# Patient Record
Sex: MALE | Race: WHITE | NOT HISPANIC OR LATINO | Employment: OTHER | URBAN - METROPOLITAN AREA
[De-identification: names, ages, dates, MRNs, and addresses within clinical notes are randomized per-mention and may not be internally consistent; named-entity substitution may affect disease eponyms.]

---

## 2017-02-03 LAB
ANION GAP SERPL CALCULATED.3IONS-SCNC: ABNORMAL MMOL/L
BUN SERPL-MCNC: 19 MG/DL
CALCIUM SERPL-MCNC: 9.1 MG/DL
CHLORIDE SERPLBLD-SCNC: 105 MMOL/L
CHOLEST SERPL-MCNC: 130 MG/DL
CHOLEST SERPL-MCNC: 130 MG/DL
CO2 SERPL-SCNC: 27 MMOL/L
CREAT SERPL-MCNC: 1 MG/DL
ERYTHROCYTE [DISTWIDTH] IN BLOOD BY AUTOMATED COUNT: 12.7 %
GFR SERPL CREATININE-BSD FRML MDRD: >60 ML/MIN/1.73M2
GLUCOSE SERPL-MCNC: 112 MG/DL (ref 70–100)
HCT VFR BLD AUTO: 45.4 %
HDLC SERPL-MCNC: 46 MG/DL
HDLC SERPL-MCNC: 46 MG/DL
HEMOGLOBIN: 15.5 G/DL (ref 13.4–17.5)
LDLC SERPL CALC-MCNC: 69 MG/DL
LDLC SERPL CALC-MCNC: 69 MG/DL
MCH RBC QN AUTO: NORMAL PG
MCHC RBC AUTO-ENTMCNC: NORMAL G/DL
MCV RBC AUTO: 86.5 FL
NONHDLC SERPL-MCNC: NORMAL MG/DL
NONHDLC SERPL-MCNC: NORMAL MG/DL
PLATELET # BLD AUTO: 188 10^9/L
POTASSIUM SERPL-SCNC: 4.4 MMOL/L
RBC # BLD AUTO: 5.25 10^12/L
SODIUM SERPL-SCNC: 140 MMOL/L
TRIGL SERPL-MCNC: 77 MG/DL
TRIGL SERPL-MCNC: 77 MG/DL
TSH SERPL-ACNC: 3.46 MCU/ML
WBC # BLD AUTO: 6.2 10^9/L

## 2017-03-01 ENCOUNTER — TRANSFERRED RECORDS (OUTPATIENT)
Dept: HEALTH INFORMATION MANAGEMENT | Facility: CLINIC | Age: 62
End: 2017-03-01

## 2017-03-02 DIAGNOSIS — Z98.61 CAD S/P PERCUTANEOUS CORONARY ANGIOPLASTY: ICD-10-CM

## 2017-03-02 DIAGNOSIS — I25.10 CAD S/P PERCUTANEOUS CORONARY ANGIOPLASTY: ICD-10-CM

## 2017-03-02 RX ORDER — ATORVASTATIN CALCIUM 80 MG/1
80 TABLET, FILM COATED ORAL DAILY
Qty: 90 TABLET | Refills: 3 | Status: SHIPPED | OUTPATIENT
Start: 2017-03-02 | End: 2018-05-25

## 2017-03-06 DIAGNOSIS — R07.89 CHEST PAIN, MIDSTERNAL: ICD-10-CM

## 2017-03-06 RX ORDER — METOPROLOL SUCCINATE 25 MG/1
12.5 TABLET, EXTENDED RELEASE ORAL DAILY
Qty: 45 TABLET | Refills: 3 | Status: SHIPPED | OUTPATIENT
Start: 2017-03-06 | End: 2018-04-20

## 2017-03-07 ENCOUNTER — TRANSFERRED RECORDS (OUTPATIENT)
Dept: HEALTH INFORMATION MANAGEMENT | Facility: CLINIC | Age: 62
End: 2017-03-07

## 2017-12-12 ENCOUNTER — SURGERY (OUTPATIENT)
Age: 62
End: 2017-12-12

## 2017-12-12 ENCOUNTER — HOSPITAL ENCOUNTER (OUTPATIENT)
Facility: CLINIC | Age: 62
Discharge: HOME | End: 2017-12-12
Attending: COLON & RECTAL SURGERY | Admitting: COLON & RECTAL SURGERY
Payer: COMMERCIAL

## 2017-12-12 VITALS
OXYGEN SATURATION: 96 % | SYSTOLIC BLOOD PRESSURE: 132 MMHG | DIASTOLIC BLOOD PRESSURE: 94 MMHG | RESPIRATION RATE: 10 BRPM

## 2017-12-12 LAB — COLONOSCOPY: NORMAL

## 2017-12-12 PROCEDURE — 45378 DIAGNOSTIC COLONOSCOPY: CPT | Performed by: COLON & RECTAL SURGERY

## 2017-12-12 PROCEDURE — G0500 MOD SEDAT ENDO SERVICE >5YRS: HCPCS | Performed by: COLON & RECTAL SURGERY

## 2017-12-12 PROCEDURE — 25000128 H RX IP 250 OP 636: Performed by: COLON & RECTAL SURGERY

## 2017-12-12 PROCEDURE — G0121 COLON CA SCRN NOT HI RSK IND: HCPCS | Performed by: COLON & RECTAL SURGERY

## 2017-12-12 RX ORDER — NALOXONE HYDROCHLORIDE 0.4 MG/ML
.1-.4 INJECTION, SOLUTION INTRAMUSCULAR; INTRAVENOUS; SUBCUTANEOUS
Status: DISCONTINUED | OUTPATIENT
Start: 2017-12-12 | End: 2017-12-12 | Stop reason: HOSPADM

## 2017-12-12 RX ORDER — ONDANSETRON 2 MG/ML
4 INJECTION INTRAMUSCULAR; INTRAVENOUS
Status: DISCONTINUED | OUTPATIENT
Start: 2017-12-12 | End: 2017-12-12 | Stop reason: HOSPADM

## 2017-12-12 RX ORDER — FENTANYL CITRATE 50 UG/ML
INJECTION, SOLUTION INTRAMUSCULAR; INTRAVENOUS PRN
Status: DISCONTINUED | OUTPATIENT
Start: 2017-12-12 | End: 2017-12-12 | Stop reason: HOSPADM

## 2017-12-12 RX ORDER — FLUMAZENIL 0.1 MG/ML
0.2 INJECTION, SOLUTION INTRAVENOUS
Status: DISCONTINUED | OUTPATIENT
Start: 2017-12-12 | End: 2017-12-12 | Stop reason: HOSPADM

## 2017-12-12 RX ORDER — ONDANSETRON 4 MG/1
4 TABLET, ORALLY DISINTEGRATING ORAL EVERY 6 HOURS PRN
Status: DISCONTINUED | OUTPATIENT
Start: 2017-12-12 | End: 2017-12-12 | Stop reason: HOSPADM

## 2017-12-12 RX ORDER — LIDOCAINE 40 MG/G
CREAM TOPICAL
Status: DISCONTINUED | OUTPATIENT
Start: 2017-12-12 | End: 2017-12-12 | Stop reason: HOSPADM

## 2017-12-12 RX ORDER — ONDANSETRON 2 MG/ML
4 INJECTION INTRAMUSCULAR; INTRAVENOUS EVERY 6 HOURS PRN
Status: DISCONTINUED | OUTPATIENT
Start: 2017-12-12 | End: 2017-12-12 | Stop reason: HOSPADM

## 2017-12-12 RX ADMIN — MIDAZOLAM 2 MG: 1 INJECTION INTRAMUSCULAR; INTRAVENOUS at 16:19

## 2017-12-12 RX ADMIN — FENTANYL CITRATE 100 MCG: 50 INJECTION, SOLUTION INTRAMUSCULAR; INTRAVENOUS at 16:17

## 2017-12-12 NOTE — BRIEF OP NOTE
[facility] Brief Operative Note    Pre-operative diagnosis: 10 YEAR RECALL   Post-operative diagnosis normal colonoscopy   Procedure: Procedure(s):  COLONOSCOPY - Wound Class: II-Clean Contaminated   Surgeon(s): Surgeon(s) and Role:     * Michael Kramer MD - Primary   Estimated blood loss: * No values recorded between 12/12/2017 12:00 AM and 12/12/2017  4:39 PM *    Specimens: * No specimens in log *   Findings: normal colonoscopy  See provation procedure note in chart review.    Michael Kramer MD  [facility]  [phone]

## 2017-12-12 NOTE — H&P
[facility]    History and Physical  Colon and Rectal Surgery     Date of Admission:  12/12/2017      Assessment & Plan   Keith Corea is a 62 year old male who presents for colonoscopy.    Indication: screening  Plan for Colonoscopy with possible biopsy, possible polypectomy. We discussed the risks, benefits and alternatives and the patient wished to proceed.    The above has been forwarded to the consulting provider.      Michael Kramer MD  [facility], [facility]  [phone]        Code Status   Full Code    Primary Care Physician   Amy Guzman      History is obtained from the patient    History of Present Illness   Keith Corea is a 62 year old male who presents for screening    Past Medical History    I have reviewed this patient's medical history and updated it with pertinent information if needed.   Past Medical History:   Diagnosis Date     Allergic rhinitis, cause unspecified      CAD (coronary artery disease) 10/2012    95% narrowing ramus intermedius     Contact dermatitis and other eczema, due to unspecified cause      Hyperlipidemia LDL goal <70     mixed     Impaired fasting glucose      Mixed hyperlipidemia      Sleep apnea        Past Surgical History   I have reviewed this patient's surgical history and updated it with pertinent information if needed.  Past Surgical History:   Procedure Laterality Date     CL AFF SURGICAL PATHOLOGY      lipoma excision, left trunk     ESOPHAGOSCOPY, GASTROSCOPY, DUODENOSCOPY (EGD), COMBINED  10/21/2011    Procedure:COMBINED ESOPHAGOSCOPY, GASTROSCOPY, DUODENOSCOPY (EGD), BIOPSY SINGLE OR MULTIPLE; Surgeon:MATTHEW SALMERON; Location: GI     HC COLONOSCOPY THRU STOMA, DIAGNOSTIC  2007    NL, repeat in 10 years     HEART CATH, ANGIOPLASTY  10-01-12    PTCA and implantation of 3.0 x 18 mm length Medtronic Resolute zotarolimus-eluting stent in the mid segment of the ramus intermedius branch      VASECTOMY  1993        Prior to Admission Medications   Prior to Admission Medications   Prescriptions Last Dose Informant Patient Reported? Taking?   Fexofenadine HCl (ALLEGRA ALLERGY PO)   Yes No   Sig: Take by mouth as needed    aspirin 81 MG EC tablet   No No   Sig: Take 4 tablets (325 mg) by mouth daily Start tomorrow morning.   atorvastatin (LIPITOR) 80 MG tablet   No No   Sig: Take 1 tablet (80 mg) by mouth daily   metoprolol (TOPROL-XL) 25 MG 24 hr tablet   No No   Sig: Take 0.5 tablets (12.5 mg) by mouth daily   nitroglycerin (NITROSTAT) 0.4 MG SL tablet   No No   Sig: Place 1 tablet (0.4 mg) under the tongue every 5 minutes as needed for chest pain If you are still having symptoms after 3 doses (15 minutes) call 911.      Facility-Administered Medications: None     Allergies   Allergies   Allergen Reactions     Seasonal Allergies        Social History   I have reviewed this patient's social history and updated it with pertinent information if needed. Keith Corea  reports that he has never smoked. He has never used smokeless tobacco. He reports that he drinks alcohol. He reports that he does not use illicit drugs.    Family History   I have reviewed this patient's family history and updated it with pertinent information if needed.   Family History   Problem Relation Age of Onset     Lipids Mother      CANCER Mother      lung, smoker     Lipids Father      Neurologic Disorder Brother      syncope     HEART DISEASE Maternal Grandfather       of MI     Connective Tissue Disorder Daughter      Sjogren's syndrome       Review of Systems   C: NEGATIVE for fever, chills, change in weight  E/M: NEGATIVE for ear, mouth and throat problems  R: NEGATIVE for significant cough or SOB  CV: NEGATIVE for chest pain, palpitations or peripheral edema    Physical Exam                      Vital Signs with Ranges     0 lbs 0 oz    Constitutional: awake, alert, cooperative, no apparent distress, and appears stated age  AIRWAY EXAM:  Mallampatti Class I (visualization of the soft palate, fauces, uvula, anterior and posterior pillars)  Respiratory: No increased work of breathing, good air exchange, clear to auscultation bilaterally, no crackles or wheezing  Cardiovascular: Normal apical impulse, regular rate and rhythm, normal S1 and S2, no S3 or S4, and no murmur noted  ASA Class: 2 - Mild systemic disease

## 2017-12-19 PROBLEM — I25.10 CORONARY ARTERY DISEASE INVOLVING NATIVE CORONARY ARTERY OF NATIVE HEART WITHOUT ANGINA PECTORIS: Status: ACTIVE | Noted: 2017-12-19

## 2017-12-27 ENCOUNTER — PRE VISIT (OUTPATIENT)
Dept: CARDIOLOGY | Facility: CLINIC | Age: 62
End: 2017-12-27

## 2018-01-19 ENCOUNTER — OFFICE VISIT (OUTPATIENT)
Dept: CARDIOLOGY | Facility: CLINIC | Age: 63
End: 2018-01-19
Attending: INTERNAL MEDICINE
Payer: COMMERCIAL

## 2018-01-19 VITALS
DIASTOLIC BLOOD PRESSURE: 76 MMHG | BODY MASS INDEX: 26.96 KG/M2 | HEART RATE: 47 BPM | SYSTOLIC BLOOD PRESSURE: 123 MMHG | HEIGHT: 69 IN | WEIGHT: 182 LBS

## 2018-01-19 DIAGNOSIS — I25.10 CAD S/P PERCUTANEOUS CORONARY ANGIOPLASTY: ICD-10-CM

## 2018-01-19 DIAGNOSIS — Z98.61 CAD S/P PERCUTANEOUS CORONARY ANGIOPLASTY: ICD-10-CM

## 2018-01-19 LAB
CHOLEST SERPL-MCNC: 136 MG/DL
HDLC SERPL-MCNC: 50 MG/DL
LDLC SERPL CALC-MCNC: 70 MG/DL
NONHDLC SERPL-MCNC: 86 MG/DL
TRIGL SERPL-MCNC: 82 MG/DL

## 2018-01-19 PROCEDURE — 36415 COLL VENOUS BLD VENIPUNCTURE: CPT | Performed by: INTERNAL MEDICINE

## 2018-01-19 PROCEDURE — 99213 OFFICE O/P EST LOW 20 MIN: CPT | Performed by: INTERNAL MEDICINE

## 2018-01-19 PROCEDURE — 80061 LIPID PANEL: CPT | Performed by: INTERNAL MEDICINE

## 2018-01-19 NOTE — LETTER
1/19/2018      Amy Megan, APRN CNP  [address]  [place] [address]      RE: Keith Spraguear       Dear Colleague,    I had the pleasure of seeing Keith Corea in the [facility].    HISTORY OF PRESENT ILLNESS:  I had the pleasure of seeing Mr. Corea in followup at the [facility] today.  He is a very pleasant 62-year-old gentleman whom I last saw in 01/2016.  He has a history of coronary artery disease and is status post stenting of a 95% lesion in the ramus intermedius in 09/2012 for exertional angina.  He also had a 70%-75% lesion in the LAD which we have been following by serial stress testing.  His most recent stress echocardiogram in 2015 demonstrated no evidence of ischemia and he exhibited excellent exercise tolerance, completing 14 minutes on the Qamar protocol.      He presents today feeling well overall from a cardiovascular standpoint.  He specifically denies any chest pain, dyspnea on exertion, PND, orthopnea or lower extremity edema.  He denies any syncope or presyncope.  He continues to ski regularly and covered 24 miles over the weekend.  He denies any syncope or presyncope.  He has been compliant with all of his medications.      PHYSICAL EXAMINATION:  dictated below.      A lipid panel has been ordered for today and is still pending.      IMPRESSION:   1.  Coronary artery disease, status post drug-eluting stent placement to the ramus intermedius in 10/2012.   2.  Moderate LAD disease that has not been found to be flow-limiting based on serial stress imaging studies.   3.  Dyslipidemia.      Mr. Corea is doing well overall from a cardiovascular standpoint.  There is no evidence of angina or congestive heart failure.  His medications appear appropriate, although we will check a repeat lipid panel today for reassessment in this regard.      Given his moderate LAD disease and his plans for racing the [employer] in February,  I think it would be reasonable to repeat a stress perfusion study.  If this is within normal limits, I will have him follow up with me in approximately 1 year.        Outpatient Encounter Prescriptions as of 1/19/2018   Medication Sig Dispense Refill     metoprolol (TOPROL-XL) 25 MG 24 hr tablet Take 0.5 tablets (12.5 mg) by mouth daily 45 tablet 3     atorvastatin (LIPITOR) 80 MG tablet Take 1 tablet (80 mg) by mouth daily 90 tablet 3     aspirin 81 MG EC tablet Take 4 tablets (325 mg) by mouth daily Start tomorrow morning. 90 tablet 3     nitroglycerin (NITROSTAT) 0.4 MG SL tablet Place 1 tablet (0.4 mg) under the tongue every 5 minutes as needed for chest pain If you are still having symptoms after 3 doses (15 minutes) call 911. 25 tablet 2     Fexofenadine HCl (ALLEGRA ALLERGY PO) Take by mouth as needed        No facility-administered encounter medications on file as of 1/19/2018.        Again, thank you for allowing me to participate in the care of your patient.      Sincerely,    Amandeep Clark MD     [facility]

## 2018-01-19 NOTE — MR AVS SNAPSHOT
After Visit Summary   1/19/2018    Keith Corea    MRN: [id number]           Patient Information     Date Of Birth          1955        Visit Information        Provider Department      1/19/2018 3:15 PM Amandeep Clark MD [facility]   [place]        Today's Diagnoses     CAD S/P percutaneous coronary angioplasty           Follow-ups after your visit        Additional Services     Follow-Up with Cardiologist                 Your next 10 appointments already scheduled     Jan 23, 2018  8:00 AM CST   NM SH CV MPI MULT RST ST 1 DAY with SCINM1   [facility] CV Nuclear Medicine (Cardiovascular Imaging at [facility])    [address]  [address]  [place] [address] [phone]           For a ONE day exam: Allow 3-4 hours for test. For a TWO day exam: Allow 2 hours PER day for test.  You may need to stop some medicines before the test. Follow your doctor s orders. - If you take a beta blocker: Follow your doctor s specific instructions on taking it prior to and on the day of your exam. - If you take Aggrenox or dipyridamole (Persantine, Permole), stop taking it 48 hours before your test. - If you take Viagra, Cialis or Levitra, stop taking it 48 hours before your test. - If you take theophylline or aminophylline, stop taking it 12 hours before your test.  For patients with diabetes: - If you take insulin, call your diabetes care team. Ask if you should take a 1/2 dose the morning of your test. - If you take diabetes medicine by mouth, don t take it on the morning of your test. Bring it with you to take after the test. (If you have questions, call your diabetes care team.)  Do not take nitrates on the day of your test. Do not wear your Nitro-Patch.  Stop all caffeine 12 hours before the test. This includes coffee, tea, soda pop, chocolate and certain medicines (such as Anacin, Excedrin and NoDoz). Also avoid decaf coffee and tea, as  "these contain small amounts of caffeine.  No alcohol, smoking or other tobacco for 12 hours before the test.  Stop eating 3 hours before the test. You may drink water.  Please wear a loose two-piece outfit. If you will have an exercise test, bring rubber-soled walking shoes.  When you arrive, please tell us if you: - Have diabetes - Are breastfeeding - May be pregnant - Have a pacemaker of ICD (implantable defibrillator).  Please call your Imaging Department at your exam site with any questions.              Who to contact     If you have questions or need follow up information about today's clinic visit or your schedule please contact [facility] directly at [phone].  Normal or non-critical lab and imaging results will be communicated to you by ecoInsighthart, letter or phone within 4 business days after the clinic has received the results. If you do not hear from us within 7 days, please contact the clinic through ecoInsighthart or phone. If you have a critical or abnormal lab result, we will notify you by phone as soon as possible.  Submit refill requests through [facility] or call your pharmacy and they will forward the refill request to us. Please allow 3 business days for your refill to be completed.          Additional Information About Your Visit        ecoInsightharSoloPower Information     [facility] lets you send messages to your doctor, view your test results, renew your prescriptions, schedule appointments and more. To sign up, go to [email or web address] . Click on \"Log in\" on the left side of the screen, which will take you to the Welcome page. Then click on \"Sign up Now\" on the right side of the page.     You will be asked to enter the access code listed below, as well as some personal information. Please follow the directions to create your username and password.     Your access code is: [email or web address]  Expires: 2018  8:14 AM     Your access code will  in 90 days. If you need help " "or a new code, please call your [facility] clinic or [phone].        Care EveryWhere ID     This is your Care EveryWhere ID. This could be used by other organizations to access your [facility] medical records  [id number]        Your Vitals Were     Pulse Height BMI (Body Mass Index)             47 1.753 m (5' 9.02\") 26.86 kg/m2          Blood Pressure from Last 3 Encounters:   01/19/18 123/76   12/12/17 (!) 132/94   01/18/16 94/64    Weight from Last 3 Encounters:   01/19/18 82.6 kg (182 lb)   01/18/16 83 kg (183 lb)   10/28/15 82.1 kg (181 lb)              We Performed the Following     Follow-Up with Cardiologist        Primary Care Provider Office Phone # Fax #    NAYA Chiu [phone]       [address]  [place] [address]        Equal Access to Services     [facility]: Hadii aad ku hadasho Soomaali, waaxda luqadaha, qaybta kaalmada adeegyada, waxay idiin hayaan adeeg kharash la'arlenen . So [facility] [phone].    ATENCIÓN: Si habla español, tiene a rivera disposición servicios gratuitos de asistencia lingüística. Llame al [phone].    We comply with applicable federal civil rights laws and [place] laws. We do not discriminate on the basis of race, color, national origin, age, disability, sex, sexual orientation, or gender identity.            Thank you!     Thank you for choosing [facility]  for your care. Our goal is always to provide you with excellent care. Hearing back from our patients is one way we can continue to improve our services. Please take a few minutes to complete the written survey that you may receive in the mail after your visit with us. Thank you!             Your Updated Medication List - Protect others around you: Learn how to safely use, store and throw away your medicines at www.disposemymeds.org.          This list is accurate as of: 1/19/18 11:59 PM.  Always use your most recent med list.                   " Brand Name Dispense Instructions for use Diagnosis    ALLEGRA ALLERGY PO      Take by mouth as needed        aspirin 81 MG EC tablet     90 tablet    Take 4 tablets (325 mg) by mouth daily Start tomorrow morning.    CAD (coronary artery disease)       atorvastatin 80 MG tablet    LIPITOR    90 tablet    Take 1 tablet (80 mg) by mouth daily    CAD S/P percutaneous coronary angioplasty       metoprolol succinate 25 MG 24 hr tablet    TOPROL-XL    45 tablet    Take 0.5 tablets (12.5 mg) by mouth daily    Chest pain, midsternal       nitroGLYcerin 0.4 MG sublingual tablet    NITROSTAT    25 tablet    Place 1 tablet (0.4 mg) under the tongue every 5 minutes as needed for chest pain If you are still having symptoms after 3 doses (15 minutes) call 911.    CAD (coronary artery disease)

## 2018-01-20 NOTE — PROGRESS NOTES
HISTORY OF PRESENT ILLNESS:  I had the pleasure of seeing Mr. Corea in followup at the [facility] today.  He is a very pleasant 62-year-old gentleman whom I last saw in 01/2016.  He has a history of coronary artery disease and is status post stenting of a 95% lesion in the ramus intermedius in 09/2012 for exertional angina.  He also had a 70%-75% lesion in the LAD which we have been following by serial stress testing.  His most recent stress echocardiogram in 2015 demonstrated no evidence of ischemia and he exhibited excellent exercise tolerance, completing 14 minutes on the Qamar protocol.      He presents today feeling well overall from a cardiovascular standpoint.  He specifically denies any chest pain, dyspnea on exertion, PND, orthopnea or lower extremity edema.  He denies any syncope or presyncope.  He continues to ski regularly and covered 24 miles over the weekend.  He denies any syncope or presyncope.  He has been compliant with all of his medications.      PHYSICAL EXAMINATION:  dictated below.      A lipid panel has been ordered for today and is still pending.      IMPRESSION:   1.  Coronary artery disease, status post drug-eluting stent placement to the ramus intermedius in 10/2012.   2.  Moderate LAD disease that has not been found to be flow-limiting based on serial stress imaging studies.   3.  Dyslipidemia.      Mr. Corea is doing well overall from a cardiovascular standpoint.  There is no evidence of angina or congestive heart failure.  His medications appear appropriate, although we will check a repeat lipid panel today for reassessment in this regard.      Given his moderate LAD disease and his plans for racing the [employer] in February, I think it would be reasonable to repeat a stress perfusion study.  If this is within normal limits, I will have him follow up with me in approximately 1 year.         KEON SINGER MD             D: 01/19/2018 15:49   T:  2018 19:17   MT: POLI      Name:     OMID ZIMMERMAN   MRN:      [id number]        Account:      [id number]   :      1955           Service Date: 2018      Document: [id number]

## 2018-01-22 ENCOUNTER — DOCUMENTATION ONLY (OUTPATIENT)
Dept: CARDIOLOGY | Facility: CLINIC | Age: 63
End: 2018-01-22

## 2018-01-22 NOTE — PROGRESS NOTES
HPI and Plan:   See dictation    Orders Placed This Encounter   Procedures     NM Exercise stress test (nuc card)     Lipid Profile       No orders of the defined types were placed in this encounter.      There are no discontinued medications.      Encounter Diagnosis   Name Primary?     CAD S/P percutaneous coronary angioplasty        CURRENT MEDICATIONS:  Current Outpatient Prescriptions   Medication Sig Dispense Refill     metoprolol (TOPROL-XL) 25 MG 24 hr tablet Take 0.5 tablets (12.5 mg) by mouth daily 45 tablet 3     atorvastatin (LIPITOR) 80 MG tablet Take 1 tablet (80 mg) by mouth daily 90 tablet 3     aspirin 81 MG EC tablet Take 4 tablets (325 mg) by mouth daily Start tomorrow morning. 90 tablet 3     nitroglycerin (NITROSTAT) 0.4 MG SL tablet Place 1 tablet (0.4 mg) under the tongue every 5 minutes as needed for chest pain If you are still having symptoms after 3 doses (15 minutes) call 911. 25 tablet 2     Fexofenadine HCl (ALLEGRA ALLERGY PO) Take by mouth as needed          ALLERGIES     Allergies   Allergen Reactions     Seasonal Allergies        PAST MEDICAL HISTORY:  Past Medical History:   Diagnosis Date     Allergic rhinitis, cause unspecified      CAD (coronary artery disease) 10/2012    95% narrowing ramus intermedius     Contact dermatitis and other eczema, due to unspecified cause      Hyperlipidemia LDL goal <70     mixed     Impaired fasting glucose      Mixed hyperlipidemia      Sleep apnea        PAST SURGICAL HISTORY:  Past Surgical History:   Procedure Laterality Date     CL AFF SURGICAL PATHOLOGY      lipoma excision, left trunk     COLONOSCOPY N/A 12/12/2017    Procedure: COLONOSCOPY;  COLONOSCOPY;  Surgeon: Michael Kramer MD;  Location:  GI     ESOPHAGOSCOPY, GASTROSCOPY, DUODENOSCOPY (EGD), COMBINED  10/21/2011    Procedure:COMBINED ESOPHAGOSCOPY, GASTROSCOPY, DUODENOSCOPY (EGD), BIOPSY SINGLE OR MULTIPLE; Surgeon:MATTHEW SALMERON; Location: GI     HC COLONOSCOPY THRU  "STOMA, DIAGNOSTIC      NL, repeat in 10 years     HEART CATH, ANGIOPLASTY  10-01-12    PTCA and implantation of 3.0 x 18 mm length Medtronic Resolute zotarolimus-eluting stent in the mid segment of the ramus intermedius branch      VASECTOMY         FAMILY HISTORY:  Family History   Problem Relation Age of Onset     Lipids Mother      CANCER Mother      lung, smoker     Lipids Father      Neurologic Disorder Brother      syncope     HEART DISEASE Maternal Grandfather       of MI     Connective Tissue Disorder Daughter      Sjogren's syndrome       SOCIAL HISTORY:  Social History     Social History     Marital status:      Spouse name: N/A     Number of children: N/A     Years of education: N/A     Social History Main Topics     Smoking status: Never Smoker     Smokeless tobacco: Never Used     Alcohol use Yes      Comment: 2 beer per day     Drug use: No     Sexual activity: Yes     Partners: Female     Other Topics Concern     Caffeine Concern No     2 pops a day     Sleep Concern No     Stress Concern No     Weight Concern No     Special Diet No     Exercise Yes     cross country skiing, biking      Bike Helmet Yes     Seat Belt Yes     Social History Narrative       Review of Systems:  Skin:  Negative       Eyes:  Positive for glasses    ENT:  Negative      Respiratory:  Negative       Cardiovascular:  Negative      Gastroenterology: Negative      Genitourinary:  Positive for prostate problem    Musculoskeletal:  Negative      Neurologic:  Positive for numbness or tingling of hands    Psychiatric:  Negative      Heme/Lymph/Imm:  Negative      Endocrine:  Negative        Physical Exam:  Vitals: /76  Pulse (!) 47  Ht 1.753 m (5' 9.02\")  Wt 82.6 kg (182 lb)  BMI 26.86 kg/m2    Constitutional:  cooperative, alert and oriented, well developed, well nourished, in no acute distress        Skin:  warm and dry to the touch, no apparent skin lesions or masses noted          Head:  " normocephalic, no masses or lesions        Eyes:           Lymph:      ENT:  no pallor or cyanosis, dentition good        Neck:           Respiratory:  normal breath sounds, clear to auscultation, normal A-P diameter, normal symmetry, normal respiratory excursion, no use of accessory muscles         Cardiac: regular rhythm, normal S1/S2, no S3 or S4, apical impulse not displaced, no murmurs, gallops or rubs                pulses full and equal, no bruits auscultated                                        GI:  abdomen soft, non-tender, BS normoactive, no mass, no HSM, no bruits        Extremities and Muscular Skeletal:  no deformities, clubbing, cyanosis, erythema observed              Neurological:           Psych:           CC  Amandeep Clark MD  [address]  [place] [id number]

## 2018-01-22 NOTE — PROGRESS NOTES
Lipid panel results noted. Done post OV on 1/19/18.   Chol HDL LDL familia Chol/HDL TG   01/19/18 1606 136 50 70 -- 82   02/03/17 0000 130 46 69 -- 77     Will message Dr. Clark for review.

## 2018-01-23 ENCOUNTER — HOSPITAL ENCOUNTER (OUTPATIENT)
Dept: CARDIOLOGY | Facility: CLINIC | Age: 63
Discharge: HOME | End: 2018-01-23
Attending: INTERNAL MEDICINE | Admitting: INTERNAL MEDICINE
Payer: COMMERCIAL

## 2018-01-23 ENCOUNTER — TELEPHONE (OUTPATIENT)
Dept: CARDIOLOGY | Facility: CLINIC | Age: 63
End: 2018-01-23

## 2018-01-23 DIAGNOSIS — I25.10 CAD S/P PERCUTANEOUS CORONARY ANGIOPLASTY: ICD-10-CM

## 2018-01-23 DIAGNOSIS — I25.10 CORONARY ARTERY DISEASE INVOLVING NATIVE CORONARY ARTERY OF NATIVE HEART WITHOUT ANGINA PECTORIS: Primary | ICD-10-CM

## 2018-01-23 DIAGNOSIS — Z98.61 CAD S/P PERCUTANEOUS CORONARY ANGIOPLASTY: ICD-10-CM

## 2018-01-23 PROCEDURE — 93018 CV STRESS TEST I&R ONLY: CPT | Performed by: INTERNAL MEDICINE

## 2018-01-23 PROCEDURE — 34300033 ZZH RX 343: Performed by: INTERNAL MEDICINE

## 2018-01-23 PROCEDURE — 93017 CV STRESS TEST TRACING ONLY: CPT

## 2018-01-23 PROCEDURE — A9502 TC99M TETROFOSMIN: HCPCS | Performed by: INTERNAL MEDICINE

## 2018-01-23 PROCEDURE — 78452 HT MUSCLE IMAGE SPECT MULT: CPT | Performed by: INTERNAL MEDICINE

## 2018-01-23 PROCEDURE — 93016 CV STRESS TEST SUPVJ ONLY: CPT | Performed by: INTERNAL MEDICINE

## 2018-01-23 RX ADMIN — TETROFOSMIN 8.5 MCI.: 1.38 INJECTION, POWDER, LYOPHILIZED, FOR SOLUTION INTRAVENOUS at 10:24

## 2018-01-23 RX ADMIN — TETROFOSMIN 3.6 MCI.: 1.38 INJECTION, POWDER, LYOPHILIZED, FOR SOLUTION INTRAVENOUS at 08:26

## 2018-01-23 NOTE — TELEPHONE ENCOUNTER
Nuclear stress test 1-23-18 - ordered at Dr. Clark OV 1-19-18 = Given his moderate LAD disease and his plans for racing the [employer] in February, I think it would be reasonable to repeat a stress perfusion study.  If this is within normal limits, I will have him follow up with me in approximately 1 year.   Results:  1.  Myocardial perfusion imaging using single isotope technique  demonstrated a small anterolateral apical defect consistent with  ischemia. There is a small area of anterior/anteroseptal ischemia  towards the base . Heterogeneous tracer uptake may reduce accuracy of  interpretation  2. Gated images demonstrated mild global hypokinesis.  The left  ventricular systolic function is 43% at rest and post stress.  3. Compared to the prior study from 1/21/2014, patchy areas of  decreased uptake in the anterior and anteroseptal septum and  inferoseptal walls were noted that appeared fixed .  4. Hypertensive response to exercise  [place]: Dr. Ashish Meadows/ Amber to review

## 2018-01-24 NOTE — TELEPHONE ENCOUNTER
There is some anterior ischemia on the study and given his known anatomy and plans for a major race I would recommend we schedule an KRYSTIAN visit and cor angio. Thanks.

## 2018-01-24 NOTE — TELEPHONE ENCOUNTER
"Per Dr. Clark's Nuclear stress teste review patient called with recommendation of an KRYSTIAN OF and Coronary angiogram. Patient agrees with plan. Patient states he occasionally feel \" a light twinge of discomfort that can occur anytime\". Patient states he recently skied 24 miles and felt good with no discomfort. Patient transferred to scheduling and KRYSTIAN  And Cath orders entered.   "

## 2018-01-29 ENCOUNTER — TELEPHONE (OUTPATIENT)
Dept: CARDIOLOGY | Facility: CLINIC | Age: 63
End: 2018-01-29

## 2018-01-29 DIAGNOSIS — I25.10 CORONARY ARTERY DISEASE INVOLVING NATIVE CORONARY ARTERY OF NATIVE HEART WITHOUT ANGINA PECTORIS: ICD-10-CM

## 2018-01-29 RX ORDER — LIDOCAINE 40 MG/G
CREAM TOPICAL
Status: CANCELLED | OUTPATIENT
Start: 2018-01-29

## 2018-01-29 RX ORDER — POTASSIUM CHLORIDE 1500 MG/1
20 TABLET, EXTENDED RELEASE ORAL
Status: CANCELLED | OUTPATIENT
Start: 2018-01-29

## 2018-01-29 RX ORDER — SODIUM CHLORIDE 9 MG/ML
INJECTION, SOLUTION INTRAVENOUS CONTINUOUS
Status: CANCELLED | OUTPATIENT
Start: 2018-01-29

## 2018-01-29 RX ORDER — ASPIRIN 81 MG/1
81 TABLET ORAL DAILY
Status: CANCELLED | OUTPATIENT
Start: 2018-01-29

## 2018-01-29 NOTE — TELEPHONE ENCOUNTER
Attempted to contact patient to review pre-angiogram prep for 1/31/18. Left message for patient to call back    Contacted patient to review pre-cath procedures: patient is scheduled for coronary angiogram (left)  on 1/31/18 . Patient's arrival time is 0900 and procedure time is 1100. Patient is aware to be NPO except for medications.  Patient has arranged for transportation and 24 hour f/u care. Patient has no known contract dye allergy. Patient is not diabetic. Patient is not taking anti-coagulation medication. Patient's renal function is WNL for procedure. Patient will continue daily aspirin dose 81 mg. Order for procedure entered.    Will message Dr. Clark to clarify aspirin dose.

## 2018-01-31 ENCOUNTER — OFFICE VISIT (OUTPATIENT)
Dept: CARDIOLOGY | Facility: CLINIC | Age: 63
End: 2018-01-31
Payer: COMMERCIAL

## 2018-01-31 ENCOUNTER — APPOINTMENT (OUTPATIENT)
Dept: CARDIOLOGY | Facility: CLINIC | Age: 63
End: 2018-01-31
Attending: INTERNAL MEDICINE
Payer: COMMERCIAL

## 2018-01-31 ENCOUNTER — HOSPITAL ENCOUNTER (OUTPATIENT)
Facility: CLINIC | Age: 63
Discharge: HOME | End: 2018-01-31
Attending: INTERNAL MEDICINE | Admitting: INTERNAL MEDICINE
Payer: COMMERCIAL

## 2018-01-31 VITALS
SYSTOLIC BLOOD PRESSURE: 104 MMHG | DIASTOLIC BLOOD PRESSURE: 68 MMHG | BODY MASS INDEX: 27.81 KG/M2 | HEART RATE: 52 BPM | HEIGHT: 69 IN | WEIGHT: 187.8 LBS

## 2018-01-31 VITALS
BODY MASS INDEX: 27.81 KG/M2 | HEART RATE: 50 BPM | DIASTOLIC BLOOD PRESSURE: 67 MMHG | TEMPERATURE: 97 F | SYSTOLIC BLOOD PRESSURE: 128 MMHG | HEIGHT: 69 IN | WEIGHT: 187.8 LBS | OXYGEN SATURATION: 98 % | RESPIRATION RATE: 16 BRPM

## 2018-01-31 DIAGNOSIS — I25.10 CORONARY ARTERY DISEASE INVOLVING NATIVE CORONARY ARTERY OF NATIVE HEART WITHOUT ANGINA PECTORIS: ICD-10-CM

## 2018-01-31 DIAGNOSIS — Z98.61 STATUS POST CORONARY ANGIOPLASTY: ICD-10-CM

## 2018-01-31 DIAGNOSIS — I25.10 CORONARY ARTERY DISEASE INVOLVING NATIVE CORONARY ARTERY OF NATIVE HEART, ANGINA PRESENCE UNSPECIFIED: ICD-10-CM

## 2018-01-31 DIAGNOSIS — Z98.61 POSTSURGICAL PERCUTANEOUS TRANSLUMINAL CORONARY ANGIOPLASTY STATUS: ICD-10-CM

## 2018-01-31 DIAGNOSIS — E78.5 HYPERLIPIDEMIA, UNSPECIFIED HYPERLIPIDEMIA TYPE: Primary | ICD-10-CM

## 2018-01-31 PROBLEM — Z98.890 STATUS POST CORONARY ANGIOGRAM: Status: ACTIVE | Noted: 2018-01-31

## 2018-01-31 LAB
ANION GAP SERPL CALCULATED.3IONS-SCNC: 6 MMOL/L (ref 3–14)
APTT PPP: 30 SEC (ref 22–37)
BUN SERPL-MCNC: 16 MG/DL (ref 7–30)
CALCIUM SERPL-MCNC: 8.7 MG/DL (ref 8.5–10.1)
CHLORIDE SERPL-SCNC: 109 MMOL/L (ref 94–109)
CO2 SERPL-SCNC: 28 MMOL/L (ref 20–32)
CREAT SERPL-MCNC: 0.94 MG/DL (ref 0.66–1.25)
ERYTHROCYTE [DISTWIDTH] IN BLOOD BY AUTOMATED COUNT: 13.5 % (ref 10–15)
GFR SERPL CREATININE-BSD FRML MDRD: 82 ML/MIN/1.7M2
GLUCOSE SERPL-MCNC: 93 MG/DL (ref 70–99)
HCT VFR BLD AUTO: 44.4 % (ref 40–53)
HGB BLD-MCNC: 15 G/DL (ref 13.3–17.7)
INR PPP: 1.01 (ref 0.86–1.14)
MCH RBC QN AUTO: 29.4 PG (ref 26.5–33)
MCHC RBC AUTO-ENTMCNC: 33.8 G/DL (ref 31.5–36.5)
MCV RBC AUTO: 87 FL (ref 78–100)
PLATELET # BLD AUTO: 187 10E9/L (ref 150–450)
POTASSIUM SERPL-SCNC: 3.7 MMOL/L (ref 3.4–5.3)
RBC # BLD AUTO: 5.1 10E12/L (ref 4.4–5.9)
SODIUM SERPL-SCNC: 143 MMOL/L (ref 133–144)
WBC # BLD AUTO: 5.6 10E9/L (ref 4–11)

## 2018-01-31 PROCEDURE — C1887 CATHETER, GUIDING: HCPCS

## 2018-01-31 PROCEDURE — 80048 BASIC METABOLIC PNL TOTAL CA: CPT | Performed by: INTERNAL MEDICINE

## 2018-01-31 PROCEDURE — 40000065 ZZH STATISTIC EKG NON-CHARGEABLE

## 2018-01-31 PROCEDURE — 27210892 ZZH CATH CR4

## 2018-01-31 PROCEDURE — 27211089 ZZH KIT ACIST INJECTOR CR3

## 2018-01-31 PROCEDURE — 99214 OFFICE O/P EST MOD 30 MIN: CPT | Performed by: PHYSICIAN ASSISTANT

## 2018-01-31 PROCEDURE — 36415 COLL VENOUS BLD VENIPUNCTURE: CPT

## 2018-01-31 PROCEDURE — 93005 ELECTROCARDIOGRAM TRACING: CPT

## 2018-01-31 PROCEDURE — 27210856 ZZH ACCESS HEART CATH CR2

## 2018-01-31 PROCEDURE — 99153 MOD SED SAME PHYS/QHP EA: CPT

## 2018-01-31 PROCEDURE — 25000128 H RX IP 250 OP 636: Performed by: INTERNAL MEDICINE

## 2018-01-31 PROCEDURE — 93571 IV DOP VEL&/PRESS C FLO 1ST: CPT | Mod: 26 | Performed by: INTERNAL MEDICINE

## 2018-01-31 PROCEDURE — C1725 CATH, TRANSLUMIN NON-LASER: HCPCS

## 2018-01-31 PROCEDURE — 93454 CORONARY ARTERY ANGIO S&I: CPT | Mod: 26 | Performed by: INTERNAL MEDICINE

## 2018-01-31 PROCEDURE — 27210795 ZZH PAD DEFIB QUICK CR4

## 2018-01-31 PROCEDURE — C1769 GUIDE WIRE: HCPCS

## 2018-01-31 PROCEDURE — 99152 MOD SED SAME PHYS/QHP 5/>YRS: CPT | Performed by: INTERNAL MEDICINE

## 2018-01-31 PROCEDURE — 40000235 ZZH STATISTIC TELEMETRY

## 2018-01-31 PROCEDURE — 92928 PRQ TCAT PLMT NTRAC ST 1 LES: CPT | Mod: LD | Performed by: INTERNAL MEDICINE

## 2018-01-31 PROCEDURE — 85610 PROTHROMBIN TIME: CPT | Performed by: INTERNAL MEDICINE

## 2018-01-31 PROCEDURE — 85347 COAGULATION TIME ACTIVATED: CPT

## 2018-01-31 PROCEDURE — 93571 IV DOP VEL&/PRESS C FLO 1ST: CPT

## 2018-01-31 PROCEDURE — 93454 CORONARY ARTERY ANGIO S&I: CPT

## 2018-01-31 PROCEDURE — 85027 COMPLETE CBC AUTOMATED: CPT | Performed by: INTERNAL MEDICINE

## 2018-01-31 PROCEDURE — 25000132 ZZH RX MED GY IP 250 OP 250 PS 637: Performed by: INTERNAL MEDICINE

## 2018-01-31 PROCEDURE — 27210787 ZZH MANIFOLD CR2

## 2018-01-31 PROCEDURE — 27210914 ZZH SHEATH CR8

## 2018-01-31 PROCEDURE — C9600 PERC DRUG-EL COR STENT SING: HCPCS

## 2018-01-31 PROCEDURE — 40000852 ZZH STATISTIC HEART CATH LAB OR EP LAB

## 2018-01-31 PROCEDURE — C1874 STENT, COATED/COV W/DEL SYS: HCPCS

## 2018-01-31 PROCEDURE — 93010 ELECTROCARDIOGRAM REPORT: CPT | Performed by: INTERNAL MEDICINE

## 2018-01-31 PROCEDURE — 27210845 ZZH DEVICE INFLATION CR5

## 2018-01-31 PROCEDURE — 99152 MOD SED SAME PHYS/QHP 5/>YRS: CPT

## 2018-01-31 PROCEDURE — 25000125 ZZHC RX 250: Performed by: INTERNAL MEDICINE

## 2018-01-31 PROCEDURE — 85730 THROMBOPLASTIN TIME PARTIAL: CPT | Performed by: INTERNAL MEDICINE

## 2018-01-31 RX ORDER — ACETAMINOPHEN 325 MG/1
325-650 TABLET ORAL EVERY 4 HOURS PRN
Status: DISCONTINUED | OUTPATIENT
Start: 2018-01-31 | End: 2018-01-31 | Stop reason: HOSPADM

## 2018-01-31 RX ORDER — PROTAMINE SULFATE 10 MG/ML
1-5 INJECTION, SOLUTION INTRAVENOUS
Status: DISCONTINUED | OUTPATIENT
Start: 2018-01-31 | End: 2018-01-31 | Stop reason: HOSPADM

## 2018-01-31 RX ORDER — PROMETHAZINE HYDROCHLORIDE 25 MG/ML
6.25-25 INJECTION, SOLUTION INTRAMUSCULAR; INTRAVENOUS EVERY 4 HOURS PRN
Status: DISCONTINUED | OUTPATIENT
Start: 2018-01-31 | End: 2018-01-31 | Stop reason: HOSPADM

## 2018-01-31 RX ORDER — ASPIRIN 81 MG/1
81-324 TABLET, CHEWABLE ORAL
Status: DISCONTINUED | OUTPATIENT
Start: 2018-01-31 | End: 2018-01-31 | Stop reason: HOSPADM

## 2018-01-31 RX ORDER — FENTANYL CITRATE 50 UG/ML
25-50 INJECTION, SOLUTION INTRAMUSCULAR; INTRAVENOUS
Status: DISCONTINUED | OUTPATIENT
Start: 2018-01-31 | End: 2018-01-31 | Stop reason: HOSPADM

## 2018-01-31 RX ORDER — PROTAMINE SULFATE 10 MG/ML
25-100 INJECTION, SOLUTION INTRAVENOUS EVERY 5 MIN PRN
Status: DISCONTINUED | OUTPATIENT
Start: 2018-01-31 | End: 2018-01-31 | Stop reason: HOSPADM

## 2018-01-31 RX ORDER — DOBUTAMINE HYDROCHLORIDE 200 MG/100ML
2-20 INJECTION INTRAVENOUS CONTINUOUS PRN
Status: DISCONTINUED | OUTPATIENT
Start: 2018-01-31 | End: 2018-01-31 | Stop reason: HOSPADM

## 2018-01-31 RX ORDER — METOPROLOL TARTRATE 1 MG/ML
5 INJECTION, SOLUTION INTRAVENOUS EVERY 5 MIN PRN
Status: DISCONTINUED | OUTPATIENT
Start: 2018-01-31 | End: 2018-01-31 | Stop reason: HOSPADM

## 2018-01-31 RX ORDER — NITROGLYCERIN 0.4 MG/1
0.4 TABLET SUBLINGUAL EVERY 5 MIN PRN
Status: DISCONTINUED | OUTPATIENT
Start: 2018-01-31 | End: 2018-01-31 | Stop reason: HOSPADM

## 2018-01-31 RX ORDER — SODIUM NITROPRUSSIDE 25 MG/ML
100-200 INJECTION INTRAVENOUS
Status: DISCONTINUED | OUTPATIENT
Start: 2018-01-31 | End: 2018-01-31 | Stop reason: HOSPADM

## 2018-01-31 RX ORDER — ASPIRIN 325 MG
325 TABLET ORAL
Status: DISCONTINUED | OUTPATIENT
Start: 2018-01-31 | End: 2018-01-31 | Stop reason: HOSPADM

## 2018-01-31 RX ORDER — NALOXONE HYDROCHLORIDE 0.4 MG/ML
0.4 INJECTION, SOLUTION INTRAMUSCULAR; INTRAVENOUS; SUBCUTANEOUS EVERY 5 MIN PRN
Status: DISCONTINUED | OUTPATIENT
Start: 2018-01-31 | End: 2018-01-31 | Stop reason: HOSPADM

## 2018-01-31 RX ORDER — ASPIRIN 81 MG/1
81 TABLET ORAL DAILY
Status: DISCONTINUED | OUTPATIENT
Start: 2018-01-31 | End: 2018-01-31 | Stop reason: HOSPADM

## 2018-01-31 RX ORDER — FUROSEMIDE 10 MG/ML
20-100 INJECTION INTRAMUSCULAR; INTRAVENOUS
Status: DISCONTINUED | OUTPATIENT
Start: 2018-01-31 | End: 2018-01-31 | Stop reason: HOSPADM

## 2018-01-31 RX ORDER — EPINEPHRINE 1 MG/ML
0.3 INJECTION, SOLUTION, CONCENTRATE INTRAVENOUS
Status: DISCONTINUED | OUTPATIENT
Start: 2018-01-31 | End: 2018-01-31 | Stop reason: HOSPADM

## 2018-01-31 RX ORDER — NITROGLYCERIN 20 MG/100ML
.07-2 INJECTION INTRAVENOUS CONTINUOUS PRN
Status: DISCONTINUED | OUTPATIENT
Start: 2018-01-31 | End: 2018-01-31 | Stop reason: HOSPADM

## 2018-01-31 RX ORDER — BUPIVACAINE HYDROCHLORIDE 2.5 MG/ML
1-10 INJECTION, SOLUTION EPIDURAL; INFILTRATION; INTRACAUDAL
Status: DISCONTINUED | OUTPATIENT
Start: 2018-01-31 | End: 2018-01-31 | Stop reason: HOSPADM

## 2018-01-31 RX ORDER — NICARDIPINE HYDROCHLORIDE 2.5 MG/ML
100 INJECTION INTRAVENOUS
Status: DISCONTINUED | OUTPATIENT
Start: 2018-01-31 | End: 2018-01-31 | Stop reason: HOSPADM

## 2018-01-31 RX ORDER — IOPAMIDOL 755 MG/ML
160 INJECTION, SOLUTION INTRAVASCULAR ONCE
Status: COMPLETED | OUTPATIENT
Start: 2018-01-31 | End: 2018-01-31

## 2018-01-31 RX ORDER — LIDOCAINE 40 MG/G
CREAM TOPICAL
Status: DISCONTINUED | OUTPATIENT
Start: 2018-01-31 | End: 2018-01-31 | Stop reason: HOSPADM

## 2018-01-31 RX ORDER — LORAZEPAM 2 MG/ML
.5-2 INJECTION INTRAMUSCULAR EVERY 4 HOURS PRN
Status: DISCONTINUED | OUTPATIENT
Start: 2018-01-31 | End: 2018-01-31 | Stop reason: HOSPADM

## 2018-01-31 RX ORDER — FLUMAZENIL 0.1 MG/ML
0.2 INJECTION, SOLUTION INTRAVENOUS
Status: DISCONTINUED | OUTPATIENT
Start: 2018-01-31 | End: 2018-01-31 | Stop reason: HOSPADM

## 2018-01-31 RX ORDER — SODIUM CHLORIDE 9 MG/ML
INJECTION, SOLUTION INTRAVENOUS CONTINUOUS
Status: DISCONTINUED | OUTPATIENT
Start: 2018-01-31 | End: 2018-01-31 | Stop reason: HOSPADM

## 2018-01-31 RX ORDER — CLOPIDOGREL 300 MG/1
300-600 TABLET, FILM COATED ORAL
Status: DISCONTINUED | OUTPATIENT
Start: 2018-01-31 | End: 2018-01-31 | Stop reason: HOSPADM

## 2018-01-31 RX ORDER — NITROGLYCERIN 5 MG/ML
100-500 VIAL (ML) INTRAVENOUS
Status: COMPLETED | OUTPATIENT
Start: 2018-01-31 | End: 2018-01-31

## 2018-01-31 RX ORDER — POTASSIUM CHLORIDE 7.45 MG/ML
10 INJECTION INTRAVENOUS
Status: DISCONTINUED | OUTPATIENT
Start: 2018-01-31 | End: 2018-01-31 | Stop reason: HOSPADM

## 2018-01-31 RX ORDER — POTASSIUM CHLORIDE 1500 MG/1
20 TABLET, EXTENDED RELEASE ORAL
Status: COMPLETED | OUTPATIENT
Start: 2018-01-31 | End: 2018-01-31

## 2018-01-31 RX ORDER — NITROGLYCERIN 5 MG/ML
100-200 VIAL (ML) INTRAVENOUS
Status: DISCONTINUED | OUTPATIENT
Start: 2018-01-31 | End: 2018-01-31 | Stop reason: HOSPADM

## 2018-01-31 RX ORDER — DEXTROSE MONOHYDRATE 25 G/50ML
12.5-5 INJECTION, SOLUTION INTRAVENOUS EVERY 30 MIN PRN
Status: DISCONTINUED | OUTPATIENT
Start: 2018-01-31 | End: 2018-01-31 | Stop reason: HOSPADM

## 2018-01-31 RX ORDER — HYDROCODONE BITARTRATE AND ACETAMINOPHEN 5; 325 MG/1; MG/1
1-2 TABLET ORAL EVERY 4 HOURS PRN
Status: DISCONTINUED | OUTPATIENT
Start: 2018-01-31 | End: 2018-01-31 | Stop reason: HOSPADM

## 2018-01-31 RX ORDER — CLOPIDOGREL BISULFATE 75 MG/1
75 TABLET ORAL
Status: DISCONTINUED | OUTPATIENT
Start: 2018-01-31 | End: 2018-01-31 | Stop reason: HOSPADM

## 2018-01-31 RX ORDER — NIFEDIPINE 10 MG/1
10 CAPSULE ORAL
Status: DISCONTINUED | OUTPATIENT
Start: 2018-01-31 | End: 2018-01-31 | Stop reason: HOSPADM

## 2018-01-31 RX ORDER — NALOXONE HYDROCHLORIDE 0.4 MG/ML
.2-.4 INJECTION, SOLUTION INTRAMUSCULAR; INTRAVENOUS; SUBCUTANEOUS
Status: DISCONTINUED | OUTPATIENT
Start: 2018-01-31 | End: 2018-01-31 | Stop reason: HOSPADM

## 2018-01-31 RX ORDER — ONDANSETRON 2 MG/ML
4 INJECTION INTRAMUSCULAR; INTRAVENOUS EVERY 4 HOURS PRN
Status: DISCONTINUED | OUTPATIENT
Start: 2018-01-31 | End: 2018-01-31 | Stop reason: HOSPADM

## 2018-01-31 RX ORDER — METHYLPREDNISOLONE SODIUM SUCCINATE 125 MG/2ML
125 INJECTION, POWDER, LYOPHILIZED, FOR SOLUTION INTRAMUSCULAR; INTRAVENOUS
Status: DISCONTINUED | OUTPATIENT
Start: 2018-01-31 | End: 2018-01-31 | Stop reason: HOSPADM

## 2018-01-31 RX ORDER — VERAPAMIL HYDROCHLORIDE 2.5 MG/ML
1-2.5 INJECTION, SOLUTION INTRAVENOUS
Status: COMPLETED | OUTPATIENT
Start: 2018-01-31 | End: 2018-01-31

## 2018-01-31 RX ORDER — MORPHINE SULFATE 2 MG/ML
1-2 INJECTION, SOLUTION INTRAMUSCULAR; INTRAVENOUS EVERY 5 MIN PRN
Status: DISCONTINUED | OUTPATIENT
Start: 2018-01-31 | End: 2018-01-31 | Stop reason: HOSPADM

## 2018-01-31 RX ORDER — HEPARIN SODIUM 1000 [USP'U]/ML
1000-10000 INJECTION, SOLUTION INTRAVENOUS; SUBCUTANEOUS EVERY 5 MIN PRN
Status: DISCONTINUED | OUTPATIENT
Start: 2018-01-31 | End: 2018-01-31 | Stop reason: HOSPADM

## 2018-01-31 RX ORDER — HYDRALAZINE HYDROCHLORIDE 20 MG/ML
10-20 INJECTION INTRAMUSCULAR; INTRAVENOUS
Status: DISCONTINUED | OUTPATIENT
Start: 2018-01-31 | End: 2018-01-31 | Stop reason: HOSPADM

## 2018-01-31 RX ORDER — ENALAPRILAT 1.25 MG/ML
1.25-2.5 INJECTION INTRAVENOUS
Status: DISCONTINUED | OUTPATIENT
Start: 2018-01-31 | End: 2018-01-31 | Stop reason: HOSPADM

## 2018-01-31 RX ORDER — DOPAMINE HYDROCHLORIDE 160 MG/100ML
2-20 INJECTION, SOLUTION INTRAVENOUS CONTINUOUS PRN
Status: DISCONTINUED | OUTPATIENT
Start: 2018-01-31 | End: 2018-01-31 | Stop reason: HOSPADM

## 2018-01-31 RX ORDER — ADENOSINE 3 MG/ML
12-12000 INJECTION, SOLUTION INTRAVENOUS
Status: DISCONTINUED | OUTPATIENT
Start: 2018-01-31 | End: 2018-01-31 | Stop reason: HOSPADM

## 2018-01-31 RX ORDER — PHENYLEPHRINE HCL IN 0.9% NACL 1 MG/10 ML
20-100 SYRINGE (ML) INTRAVENOUS
Status: DISCONTINUED | OUTPATIENT
Start: 2018-01-31 | End: 2018-01-31 | Stop reason: HOSPADM

## 2018-01-31 RX ORDER — NALOXONE HYDROCHLORIDE 0.4 MG/ML
.1-.4 INJECTION, SOLUTION INTRAMUSCULAR; INTRAVENOUS; SUBCUTANEOUS
Status: DISCONTINUED | OUTPATIENT
Start: 2018-01-31 | End: 2018-01-31 | Stop reason: HOSPADM

## 2018-01-31 RX ORDER — DIPHENHYDRAMINE HYDROCHLORIDE 50 MG/ML
25-50 INJECTION INTRAMUSCULAR; INTRAVENOUS
Status: DISCONTINUED | OUTPATIENT
Start: 2018-01-31 | End: 2018-01-31 | Stop reason: HOSPADM

## 2018-01-31 RX ORDER — ATROPINE SULFATE 0.1 MG/ML
.5-1 INJECTION INTRAVENOUS
Status: DISCONTINUED | OUTPATIENT
Start: 2018-01-31 | End: 2018-01-31 | Stop reason: HOSPADM

## 2018-01-31 RX ORDER — LIDOCAINE HYDROCHLORIDE 10 MG/ML
30 INJECTION, SOLUTION EPIDURAL; INFILTRATION; INTRACAUDAL; PERINEURAL
Status: DISCONTINUED | OUTPATIENT
Start: 2018-01-31 | End: 2018-01-31 | Stop reason: HOSPADM

## 2018-01-31 RX ORDER — LIDOCAINE HYDROCHLORIDE 10 MG/ML
1-10 INJECTION, SOLUTION EPIDURAL; INFILTRATION; INTRACAUDAL; PERINEURAL
Status: COMPLETED | OUTPATIENT
Start: 2018-01-31 | End: 2018-01-31

## 2018-01-31 RX ORDER — ATROPINE SULFATE 0.1 MG/ML
0.5 INJECTION INTRAVENOUS EVERY 5 MIN PRN
Status: DISCONTINUED | OUTPATIENT
Start: 2018-01-31 | End: 2018-01-31 | Stop reason: HOSPADM

## 2018-01-31 RX ORDER — POTASSIUM CHLORIDE 29.8 MG/ML
20 INJECTION INTRAVENOUS
Status: DISCONTINUED | OUTPATIENT
Start: 2018-01-31 | End: 2018-01-31 | Stop reason: HOSPADM

## 2018-01-31 RX ORDER — PRASUGREL 10 MG/1
10-60 TABLET, FILM COATED ORAL
Status: DISCONTINUED | OUTPATIENT
Start: 2018-01-31 | End: 2018-01-31 | Stop reason: HOSPADM

## 2018-01-31 RX ADMIN — SODIUM CHLORIDE: 9 INJECTION, SOLUTION INTRAVENOUS at 09:15

## 2018-01-31 RX ADMIN — FENTANYL CITRATE 50 MCG: 50 INJECTION, SOLUTION INTRAMUSCULAR; INTRAVENOUS at 11:05

## 2018-01-31 RX ADMIN — Medication 5000 UNITS: at 11:16

## 2018-01-31 RX ADMIN — POTASSIUM CHLORIDE 20 MEQ: 1500 TABLET, EXTENDED RELEASE ORAL at 10:01

## 2018-01-31 RX ADMIN — TICAGRELOR 180 MG: 90 TABLET ORAL at 11:37

## 2018-01-31 RX ADMIN — NITROGLYCERIN 400 MCG: 5 INJECTION, SOLUTION INTRAVENOUS at 11:47

## 2018-01-31 RX ADMIN — FENTANYL CITRATE 50 MCG: 50 INJECTION, SOLUTION INTRAMUSCULAR; INTRAVENOUS at 11:20

## 2018-01-31 RX ADMIN — ADENOSINE 192 MCG: 3 INJECTION, SOLUTION INTRAVENOUS at 11:29

## 2018-01-31 RX ADMIN — IOPAMIDOL 160 ML: 755 INJECTION, SOLUTION INTRAVASCULAR at 12:00

## 2018-01-31 RX ADMIN — MIDAZOLAM 1 MG: 1 INJECTION INTRAMUSCULAR; INTRAVENOUS at 11:05

## 2018-01-31 RX ADMIN — NITROGLYCERIN 400 MCG: 5 INJECTION, SOLUTION INTRAVENOUS at 11:15

## 2018-01-31 RX ADMIN — LIDOCAINE HYDROCHLORIDE 10 MG: 10 INJECTION, SOLUTION EPIDURAL; INFILTRATION; INTRACAUDAL; PERINEURAL at 11:12

## 2018-01-31 RX ADMIN — Medication 4000 UNITS: at 11:34

## 2018-01-31 RX ADMIN — MIDAZOLAM 1 MG: 1 INJECTION INTRAMUSCULAR; INTRAVENOUS at 11:20

## 2018-01-31 RX ADMIN — VERAPAMIL HYDROCHLORIDE 2.5 MG: 2.5 INJECTION, SOLUTION INTRAVENOUS at 11:15

## 2018-01-31 RX ADMIN — MIDAZOLAM 1 MG: 1 INJECTION INTRAMUSCULAR; INTRAVENOUS at 11:40

## 2018-01-31 RX ADMIN — FENTANYL CITRATE 50 MCG: 50 INJECTION, SOLUTION INTRAMUSCULAR; INTRAVENOUS at 11:40

## 2018-01-31 NOTE — LETTER
1/31/2018    Amy Guzman, APRN CNP  [address]  [place] [address]    RE: Keith KRISHNAN Anmol       Dear Colleague,    I had the pleasure of seeing Keith Corea in the [facility].      Cardiology Progress Note    Date of Service: 01/31/2018      Reason for visit:  H&P for angiogram    Primary cardiologist: Dr. Amandeep Clark      HPI:  Mr. Corea is a pleasant 62-year-old gentleman followed by Dr. Clark with a PMHx of known CAD, s/p stenting of a 95% lesion in the ramus intermedius in 09/2012 for exertional angina.  He also had a 70%-75% lesion in the LAD which we have been following by serial stress testing.  His most recent stress echocardiogram in 2015 had demonstrated no evidence of ischemia. He saw Dr. Clark earlier this month for routine follow up and was feeling well without angina and has remained active. In fact, he is planning to ski in a race next month. Thus, given his moderate LAD, it was felt reasonable to repeat a stress test and lipid panel for evaluation. This stress test showed a small anterolateral apical defect consistent with ischemia. He had mild global hypokinesis with an EF estimated at 43% at rest and stress. There was also a noted hypertensive response to exercise. This was reviewed by Dr. Clark, and give his anatomy and plans for race as above, it was recommended he undergo coronary angiography.     He is here today for a formal H&P prior to this procedure which is actually planned for later this morning. He continues to remain asymptomatic and has no complaints of HAIDER, chest pain, syncope/presyncope, edema, or orthopnea. He cross country skis up to 20+ miles at a time without symptoms. We discussed the results of his stress test, and the risks and benefits of coronary angiogram. He is NPO this morning, and BP is excellent Of note, his repeat FLP showed acceptable control with an LDL of 70. His HDL is 50, and TG are 82. He remains on statin medication  without any significant side effects.      ASSESSMENT/PLAN:      1. Coronary artery disease.       --Hx of drug-eluting stent placement to the ramus intermedius in 10/2012 and known prior moderate LAD disease that has not been found to be flow-limiting in the past. Treadmill stress test done 1/23/18 shows a small anterolateral apical defect consistent with ischemia. There was mild global hypokinesis with an EF estimated at 43%, and a noted hypertensive response to exercise.    --Recommendation is to proceed with coronary angiogram today, which he is agreeable to.     --->Risks and benefits of left heart catheterization and coronary angiogram were discussed with the patient in detail. These include death, MI, stroke, hematoma at puncture site, possible urgent bypass surgery for failed PCI, use of stents, possible peripheral vascular complications, arrhythmia, possible percutaneous coronary intervention, and alternative of medical therapy alone. The risks discussed also include risk of heart attack or bleeding that would require transfusion, and risk of stroke or needing emergency surgery. We discussed that contrast is used during the procedure which can potentially damage the kidney and discussion was had regarding risk of contrast induced allergic reaction, renal dysfunction, and vascular complications. I have discussed the need for DAPT if stenting is required and there are no contraindications for this. The patient has no noted history of bleeding problems or current bleeding, and no scheduled surgeries pending. The patient also has no known contrast dye allergies. Patient understands and wishes to proceed.     --For now, pending results of above, he remains on ASA 81mg daily, and Toprol XL 12.5mg daily. Noted hypertensive response to exercise but has a somewhat low resting BP. Thus, no changes for now.    2. Dyslipidemia.   --LDL 1/2018 70. Continue atorvastatin which is optimized at 80mg daily.     Follow up  plan: Post angiogram with myself 2/21/18.       Orders this Visit:  No orders of the defined types were placed in this encounter.    No orders of the defined types were placed in this encounter.    There are no discontinued medications.        CURRENT MEDICATIONS:  Current Outpatient Prescriptions   Medication Sig Dispense Refill     aspirin 81 MG EC tablet Take 1 tablet (81 mg) by mouth daily 1 tablet 0     metoprolol (TOPROL-XL) 25 MG 24 hr tablet Take 0.5 tablets (12.5 mg) by mouth daily 45 tablet 3     atorvastatin (LIPITOR) 80 MG tablet Take 1 tablet (80 mg) by mouth daily 90 tablet 3     nitroglycerin (NITROSTAT) 0.4 MG SL tablet Place 1 tablet (0.4 mg) under the tongue every 5 minutes as needed for chest pain If you are still having symptoms after 3 doses (15 minutes) call 911. 25 tablet 2     Fexofenadine HCl (ALLEGRA ALLERGY PO) Take by mouth as needed          ALLERGIES     Allergies   Allergen Reactions     Seasonal Allergies        PAST MEDICAL HISTORY:  Past Medical History:   Diagnosis Date     Allergic rhinitis, cause unspecified      CAD (coronary artery disease) 10/2012    95% narrowing ramus intermedius     Contact dermatitis and other eczema, due to unspecified cause      Hyperlipidemia LDL goal <70     mixed     Impaired fasting glucose      Mixed hyperlipidemia      Sleep apnea        PAST SURGICAL HISTORY:  Past Surgical History:   Procedure Laterality Date     CL AFF SURGICAL PATHOLOGY      lipoma excision, left trunk     COLONOSCOPY N/A 12/12/2017    Procedure: COLONOSCOPY;  COLONOSCOPY;  Surgeon: Michael Kramer MD;  Location:  GI     ESOPHAGOSCOPY, GASTROSCOPY, DUODENOSCOPY (EGD), COMBINED  10/21/2011    Procedure:COMBINED ESOPHAGOSCOPY, GASTROSCOPY, DUODENOSCOPY (EGD), BIOPSY SINGLE OR MULTIPLE; Surgeon:MATTHEW SALMERON; Location: GI     HC COLONOSCOPY THRU STOMA, DIAGNOSTIC  2007    NL, repeat in 10 years     HEART CATH, ANGIOPLASTY  10-01-12    PTCA and implantation of 3.0 x  "18 mm length Medtronic Resolute zotarolimus-eluting stent in the mid segment of the ramus intermedius branch      VASECTOMY  1993       FAMILY HISTORY:  Family History   Problem Relation Age of Onset     Lipids Mother      CANCER Mother      lung, smoker     Lipids Father      Neurologic Disorder Brother      syncope     HEART DISEASE Maternal Grandfather       of MI     Connective Tissue Disorder Daughter      Sjogren's syndrome       SOCIAL HISTORY:  Social History     Social History     Marital status:      Spouse name: N/A     Number of children: N/A     Years of education: N/A     Social History Main Topics     Smoking status: Never Smoker     Smokeless tobacco: Never Used     Alcohol use Yes      Comment: 2 beer per day     Drug use: No     Sexual activity: Yes     Partners: Female     Other Topics Concern     Caffeine Concern No     2 pops a day     Sleep Concern No     Stress Concern No     Weight Concern No     Special Diet No     Exercise Yes     cross country skiing, biking      Bike Helmet Yes     Seat Belt Yes     Social History Narrative       Review of Systems:  Cardiovascular: negative for chest pain, palpitations, orthopnea, LE edema  Constitutional: negative for chills, sweats, fevers   Resp: Negative for dyspnea at rest, dyspnea on exertion, cough, wheezing, known chronic lung disease  HEENT: Negative for new visual changes, frequent headaches  Gastrointestinal: negative for abdominal pain, diarrhea, nausea, vomiting  Hematologic/lymphatic: negative for current systemic anticoagulation, hx of blood clots  Musculoskeletal: negative for new back pain, joint pain  Neurological: negative for focal weakness, LOC, seizures, syncope      Physical Exam:  Vitals: /68  Pulse 52  Ht 1.753 m (5' 9\")  Wt 85.2 kg (187 lb 12.8 oz)  BMI 27.73 kg/m2   Wt Readings from Last 4 Encounters:   18 85.2 kg (187 lb 12.8 oz)   18 82.6 kg (182 lb)   16 83 kg (183 lb)   10/28/15 82.1 kg " (181 lb)       GEN:  In general, this is a well nourished  male in no acute distress on room air.  Patient ambulatory.  HEENT:  Pupils equal, round. Sclerae nonicteric. Clear oropharynx. Mucous membranes moist.  NECK: Supple, no masses appreciated. Trachea midline. No JVD.  C/V:  Regular rate and rhythm, no murmur, rub or gallop.   RESP: Respirations are unlabored. No use of accessory muscles. Clear to auscultation bilaterally without wheezing, rales, or rhonchi.  GI: Abdomen soft, nontender, nondistended.   EXTREM: No LE edema. No cyanosis or clubbing.  NEURO: Alert and oriented, cooperative. Gait normal. No obvious focal deficits.   PSYCH: Normal affect.  SKIN: Warm and dry. No rashes or petechiae appreciated.       Recent Lab Results:  LIPID RESULTS:  Lab Results   Component Value Date    CHOL 136 01/19/2018    HDL 50 01/19/2018    LDL 70 01/19/2018    TRIG 82 01/19/2018       New/Pertinent imaging results since last visit:    1/23/18  Impression  1.  Myocardial perfusion imaging using single isotope technique  demonstrated a small anterolateral apical defect consistent with  ischemia. There is a small area of anterior/anteroseptal ischemia  towards the base . Heterogeneous tracer uptake may reduce accuracy of  interpretation  2. Gated images demonstrated mild global hypokinesis.  The left  ventricular systolic function is 43% at rest and post stress.  3. Compared to the prior study from 1/21/2014, patchy areas of  decreased uptake in the anterior and anteroseptal septum and  inferoseptal walls were noted that appeared fixed .  4. Hypertensive response to exercise      Joy Calhoun PA-C  [facility]  Pager [phone]    Thank you for allowing me to participate in the care of your patient.    Sincerely,     TON Verma     [facility]

## 2018-01-31 NOTE — IP AVS SNAPSHOT
[place] [address]    [place] [address]    Phone:  [phone]                                       After Visit Summary   1/31/2018    Keith Corea    MRN: [id number]           After Visit Summary Signature Page     I have received my discharge instructions, and my questions have been answered. I have discussed any challenges I see with this plan with the nurse or doctor.    ..........................................................................................................................................  Patient/Patient Representative Signature      ..........................................................................................................................................  Patient Representative Print Name and Relationship to Patient    ..................................................               ................................................  Date                                            Time    ..........................................................................................................................................  Reviewed by Signature/Title    ...................................................              ..............................................  Date                                                            Time

## 2018-01-31 NOTE — PROGRESS NOTES
Patient returns from the cath lab, alert and oriented, skin is warm and dry, color is good.  TR on rt radial site, 12 cc in TR band, no bleed, good CMS.  Monitor shows sinus rhythm.  Wife at bedside and Dr. Garcia speaking with both pt and the wife.  Given ice water and a menu.

## 2018-01-31 NOTE — PROGRESS NOTES
Patient arrives ambulatory, alert and oriented for his heart cath.  Skin is warm and dry, color is good and wife is at bedside.

## 2018-01-31 NOTE — MR AVS SNAPSHOT
After Visit Summary   1/31/2018    Keith Corea    MRN: [id number]           Patient Information     Date Of Birth          1955        Visit Information        Provider Department      1/31/2018 8:00 AM Joy Calhoun PA [facility]        Today's Diagnoses     Coronary artery disease involving native coronary artery of native heart without angina pectoris          Care Instructions    Visit Summary:    Today we discussed:   Risks and benefits of your angiogram today.  No medication changes for now.     Follow up:   With Joy on 2/21/18 as scheduled.     Please call my nurse Rosemarie at [phone] with any questions or concerns. Scheduling phone number: [phone] if needed.                 Follow-ups after your visit        Your next 10 appointments already scheduled     Jan 31, 2018 11:00 AM CST   Cath 90 Minute with SHCVR2   [facility] ([facility])    [address] [address] [phone]            Feb 21, 2018  8:00 AM CST   Return Visit with TON Verma   [facility] ([facility])    [address]  [facility] [address] [phone]              Who to contact     If you have questions or need follow up information about today's clinic visit or your schedule please contact [facility] directly at [phone].  Normal or non-critical lab and imaging results will be communicated to you by MyChart, letter or phone within 4 business days after the clinic has received the results. If you do not hear from us within 7 days, please contact the clinic through MyChart or phone. If you have a critical or abnormal lab result, we will notify you by phone as soon as possible.  Submit refill requests through [employer] or call your pharmacy and they will forward the refill request to us.  "Please allow 3 business days for your refill to be completed.          Additional Information About Your Visit        MyChart Information     [employer] lets you send messages to your doctor, view your test results, renew your prescriptions, schedule appointments and more. To sign up, go to [email or web address] . Click on \"Log in\" on the left side of the screen, which will take you to the Welcome page. Then click on \"Sign up Now\" on the right side of the page.     You will be asked to enter the access code listed below, as well as some personal information. Please follow the directions to create your username and password.     Your access code is: [email or web address]  Expires: 2018  8:14 AM     Your access code will  in 90 days. If you need help or a new code, please call your [place] clinic or [phone].        Care EveryWhere ID     This is your Care EveryWhere ID. This could be used by other organizations to access your [place] medical records  [id number]        Your Vitals Were     Pulse Height BMI (Body Mass Index)             52 1.753 m (5' 9\") 27.73 kg/m2          Blood Pressure from Last 3 Encounters:   18 104/68   18 123/76   17 (!) 132/94    Weight from Last 3 Encounters:   18 85.2 kg (187 lb 12.8 oz)   18 82.6 kg (182 lb)   16 83 kg (183 lb)              We Performed the Following     Follow-Up with Cardiac Advanced Practice Provider        Primary Care Provider Office Phone # Fax #    Amy Guzman, [place]AY[place] [phone]       [address]  [place] [address]        Equal Access to Services     IGNA[place]IA [place] : Umu Reina, sera easley, coco bakeralmaarlene wilson, hay rubio. So [facility] [phone].    ATENCIÓN: Si habla español, tiene a rivera disposición servicios gratuitos de asistencia lingüística. Willem al [phone].    We comply with applicable federal civil rights laws and [place] " laws. We do not discriminate on the basis of race, color, national origin, age, disability, sex, sexual orientation, or gender identity.            Thank you!     Thank you for choosing [facility]  for your care. Our goal is always to provide you with excellent care. Hearing back from our patients is one way we can continue to improve our services. Please take a few minutes to complete the written survey that you may receive in the mail after your visit with us. Thank you!             Your Updated Medication List - Protect others around you: Learn how to safely use, store and throw away your medicines at www.disposemymeds.org.          This list is accurate as of 1/31/18  8:18 AM.  Always use your most recent med list.                   Brand Name Dispense Instructions for use Diagnosis    ALLEGRA ALLERGY PO      Take by mouth as needed        aspirin 81 MG EC tablet     1 tablet    Take 1 tablet (81 mg) by mouth daily    Coronary artery disease involving native coronary artery of native heart without angina pectoris       atorvastatin 80 MG tablet    LIPITOR    90 tablet    Take 1 tablet (80 mg) by mouth daily    CAD S/P percutaneous coronary angioplasty       metoprolol succinate 25 MG 24 hr tablet    TOPROL-XL    45 tablet    Take 0.5 tablets (12.5 mg) by mouth daily    Chest pain, midsternal       nitroGLYcerin 0.4 MG sublingual tablet    NITROSTAT    25 tablet    Place 1 tablet (0.4 mg) under the tongue every 5 minutes as needed for chest pain If you are still having symptoms after 3 doses (15 minutes) call 911.    CAD (coronary artery disease)

## 2018-01-31 NOTE — CONSULTS
I have examined the patient, reviewed the history, medications and pre procedural tests. Excellent exercise tolerance with technically challenging study suggesting possible anterior wall ischemia in active 62 year old man with known disease in LAD. Old history PCI RI. I have explained to the patient the risks of death, MI, stroke, hematoma, possible urgent bypass surgery for failed PCI, use of stents, thienopyridine agents, possible peripheral vascular complications, arrhythmia, the use of FFR in clinical decision-making and alternative of medical therapy alone in regards to left heart catheterization, left ventriculography, coronary angiography, and possible percutaneous coronary intervention. The patient voiced understanding and wishes to proceed. The patient has a good right radial pulse, normal ulnar pulse and a normal Alf's sign.

## 2018-01-31 NOTE — PROGRESS NOTES
Cardiology Progress Note    Date of Service: 01/31/2018      Reason for visit:  H&P for angiogram    Primary cardiologist: Dr. Amandeep Clark      HPI:  Mr. Corea is a pleasant 62-year-old gentleman followed by Dr. Clark with a PMHx of known CAD, s/p stenting of a 95% lesion in the ramus intermedius in 09/2012 for exertional angina.  He also had a 70%-75% lesion in the LAD which we have been following by serial stress testing.  His most recent stress echocardiogram in 2015 had demonstrated no evidence of ischemia. He saw Dr. Clark earlier this month for routine follow up and was feeling well without angina and has remained active. In fact, he is planning to ski in a race next month. Thus, given his moderate LAD, it was felt reasonable to repeat a stress test and lipid panel for evaluation. This stress test showed a small anterolateral apical defect consistent with ischemia. He had mild global hypokinesis with an EF estimated at 43% at rest and stress. There was also a noted hypertensive response to exercise. This was reviewed by Dr. Clark, and give his anatomy and plans for race as above, it was recommended he undergo coronary angiography.     He is here today for a formal H&P prior to this procedure which is actually planned for later this morning. He continues to remain asymptomatic and has no complaints of HAIDER, chest pain, syncope/presyncope, edema, or orthopnea. He cross country skis up to 20+ miles at a time without symptoms. We discussed the results of his stress test, and the risks and benefits of coronary angiogram. He is NPO this morning, and BP is excellent Of note, his repeat FLP showed acceptable control with an LDL of 70. His HDL is 50, and TG are 82. He remains on statin medication without any significant side effects.      ASSESSMENT/PLAN:      1. Coronary artery disease.       --Hx of drug-eluting stent placement to the ramus intermedius in 10/2012 and known prior moderate LAD disease that has not been found  to be flow-limiting in the past. Treadmill stress test done 1/23/18 shows a small anterolateral apical defect consistent with ischemia. There was mild global hypokinesis with an EF estimated at 43%, and a noted hypertensive response to exercise.    --Recommendation is to proceed with coronary angiogram today, which he is agreeable to.     --->Risks and benefits of left heart catheterization and coronary angiogram were discussed with the patient in detail. These include death, MI, stroke, hematoma at puncture site, possible urgent bypass surgery for failed PCI, use of stents, possible peripheral vascular complications, arrhythmia, possible percutaneous coronary intervention, and alternative of medical therapy alone. The risks discussed also include risk of heart attack or bleeding that would require transfusion, and risk of stroke or needing emergency surgery. We discussed that contrast is used during the procedure which can potentially damage the kidney and discussion was had regarding risk of contrast induced allergic reaction, renal dysfunction, and vascular complications. I have discussed the need for DAPT if stenting is required and there are no contraindications for this. The patient has no noted history of bleeding problems or current bleeding, and no scheduled surgeries pending. The patient also has no known contrast dye allergies. Patient understands and wishes to proceed.     --For now, pending results of above, he remains on ASA 81mg daily, and Toprol XL 12.5mg daily. Noted hypertensive response to exercise but has a somewhat low resting BP. Thus, no changes for now.    2. Dyslipidemia.   --LDL 1/2018 70. Continue atorvastatin which is optimized at 80mg daily.     Follow up plan: Post angiogram with myself 2/21/18.       Orders this Visit:  No orders of the defined types were placed in this encounter.    No orders of the defined types were placed in this encounter.    There are no discontinued  medications.        CURRENT MEDICATIONS:  Current Outpatient Prescriptions   Medication Sig Dispense Refill     aspirin 81 MG EC tablet Take 1 tablet (81 mg) by mouth daily 1 tablet 0     metoprolol (TOPROL-XL) 25 MG 24 hr tablet Take 0.5 tablets (12.5 mg) by mouth daily 45 tablet 3     atorvastatin (LIPITOR) 80 MG tablet Take 1 tablet (80 mg) by mouth daily 90 tablet 3     nitroglycerin (NITROSTAT) 0.4 MG SL tablet Place 1 tablet (0.4 mg) under the tongue every 5 minutes as needed for chest pain If you are still having symptoms after 3 doses (15 minutes) call 911. 30 tablet 2     Fexofenadine HCl (ALLEGRA ALLERGY PO) Take by mouth as needed          ALLERGIES     Allergies   Allergen Reactions     Seasonal Allergies        PAST MEDICAL HISTORY:  Past Medical History:   Diagnosis Date     Allergic rhinitis, cause unspecified      CAD (coronary artery disease) 10/2012    95% narrowing ramus intermedius     Contact dermatitis and other eczema, due to unspecified cause      Hyperlipidemia LDL goal <70     mixed     Impaired fasting glucose      Mixed hyperlipidemia      Sleep apnea        PAST SURGICAL HISTORY:  Past Surgical History:   Procedure Laterality Date     CL AFF SURGICAL PATHOLOGY      lipoma excision, left trunk     COLONOSCOPY N/A 12/12/2017    Procedure: COLONOSCOPY;  COLONOSCOPY;  Surgeon: Michael Kramer MD;  Location:  GI     ESOPHAGOSCOPY, GASTROSCOPY, DUODENOSCOPY (EGD), COMBINED  10/21/2011    Procedure:COMBINED ESOPHAGOSCOPY, GASTROSCOPY, DUODENOSCOPY (EGD), BIOPSY SINGLE OR MULTIPLE; Surgeon:MATTHEW SALMERON; Location:[facility] COLONOSCOPY THRU STOMA, DIAGNOSTIC  2007    NL, repeat in 10 years     HEART CATH, ANGIOPLASTY  10-01-12    PTCA and implantation of 3.0 x 18 mm length Medtronic Resolute zotarolimus-eluting stent in the mid segment of the ramus intermedius branch      VASECTOMY  1993       FAMILY HISTORY:  Family History   Problem Relation Age of Onset     Lipids Mother       "CANCER Mother      lung, smoker     Lipids Father      Neurologic Disorder Brother      syncope     HEART DISEASE Maternal Grandfather       of MI     Connective Tissue Disorder Daughter      Sjogren's syndrome       SOCIAL HISTORY:  Social History     Social History     Marital status:      Spouse name: N/A     Number of children: N/A     Years of education: N/A     Social History Main Topics     Smoking status: Never Smoker     Smokeless tobacco: Never Used     Alcohol use Yes      Comment: 2 beer per day     Drug use: No     Sexual activity: Yes     Partners: Female     Other Topics Concern     Caffeine Concern No     2 pops a day     Sleep Concern No     Stress Concern No     Weight Concern No     Special Diet No     Exercise Yes     cross country skiing, biking      Bike Helmet Yes     Seat Belt Yes     Social History Narrative       Review of Systems:  Cardiovascular: negative for chest pain, palpitations, orthopnea, LE edema  Constitutional: negative for chills, sweats, fevers   Resp: Negative for dyspnea at rest, dyspnea on exertion, cough, wheezing, known chronic lung disease  HEENT: Negative for new visual changes, frequent headaches  Gastrointestinal: negative for abdominal pain, diarrhea, nausea, vomiting  Hematologic/lymphatic: negative for current systemic anticoagulation, hx of blood clots  Musculoskeletal: negative for new back pain, joint pain  Neurological: negative for focal weakness, LOC, seizures, syncope      Physical Exam:  Vitals: /68  Pulse 52  Ht 1.753 m (5' 9\")  Wt 85.2 kg (187 lb 12.8 oz)  BMI 27.73 kg/m2   Wt Readings from Last 4 Encounters:   18 85.2 kg (187 lb 12.8 oz)   18 82.6 kg (182 lb)   16 83 kg (183 lb)   10/28/15 82.1 kg (181 lb)       GEN:  In general, this is a well nourished  male in no acute distress on room air.  Patient ambulatory.  HEENT:  Pupils equal, round. Sclerae nonicteric. Clear oropharynx. Mucous membranes " moist.  NECK: Supple, no masses appreciated. Trachea midline. No JVD.  C/V:  Regular rate and rhythm, no murmur, rub or gallop.   RESP: Respirations are unlabored. No use of accessory muscles. Clear to auscultation bilaterally without wheezing, rales, or rhonchi.  GI: Abdomen soft, nontender, nondistended.   EXTREM: No LE edema. No cyanosis or clubbing.  NEURO: Alert and oriented, cooperative. Gait normal. No obvious focal deficits.   PSYCH: Normal affect.  SKIN: Warm and dry. No rashes or petechiae appreciated.       Recent Lab Results:  LIPID RESULTS:  Lab Results   Component Value Date    CHOL 136 01/19/2018    HDL 50 01/19/2018    LDL 70 01/19/2018    TRIG 82 01/19/2018       New/Pertinent imaging results since last visit:    1/23/18  Impression  1.  Myocardial perfusion imaging using single isotope technique  demonstrated a small anterolateral apical defect consistent with  ischemia. There is a small area of anterior/anteroseptal ischemia  towards the base . Heterogeneous tracer uptake may reduce accuracy of  interpretation  2. Gated images demonstrated mild global hypokinesis.  The left  ventricular systolic function is 43% at rest and post stress.  3. Compared to the prior study from 1/21/2014, patchy areas of  decreased uptake in the anterior and anteroseptal septum and  inferoseptal walls were noted that appeared fixed .  4. Hypertensive response to exercise      Joy Calhoun PA-C  [facility]  Pager [phone]

## 2018-01-31 NOTE — PROCEDURES
Procedure  1) CAG   2) FFR mid LAD 0.63  3) stent mid LAD 38 x 2.75 Synergy stent post dilated to 3.0 mm    Findings   LMCA no significant stenosis   RCA  Nondominant no significant stenosis  LCX no significant stenosis dominant  LAD long 70% mid vessel lesion   FFR 0.63    We placed a 2.75 x 38 everolimus eluting Synergy stent, post dilated to 3.0 mm with excellent results.  LAD post intervention no residual stenosis with QUIQUE 3 flow    Plan   Asa ticagrelor high potency statin Mediterranean diet, exercise program    [place]    [place]

## 2018-01-31 NOTE — PATIENT INSTRUCTIONS
Visit Summary:    Today we discussed:   Risks and benefits of your angiogram today.  No medication changes for now.     Follow up:   With Joy on 2/21/18 as scheduled.     Please call my nurse Rosemarie at [phone] with any questions or concerns. Scheduling phone number: [phone] if needed.

## 2018-01-31 NOTE — DISCHARGE INSTRUCTIONS
Cardiac Angiogram Discharge Instructions - Radial    After you go home:      Have an adult stay with you until tomorrow.    Drink extra fluids for 2 days.    You may resume your normal diet.    No smoking       For 24 hours - due to the sedation you received:    Relax and take it easy.    Do NOT make any important or legal decisions.    Do NOT drive or operate machines at home or at work.    Do NOT drink alcohol.    Care of Wrist Puncture Site:      For the first 24 hrs - check the puncture site every 1-2 hours while awake.    It is normal to have soreness at the puncture site and mild tingling in your hand for up to 3 days.    Remove the bandaid after 24 hours. If there is minor oozing, apply another bandaid and remove it after 12 hours.    You may shower tomorrow.  Do NOT take a bath, or use a hot tub or pool for at least 3 days. Do NOT scrub the site. Do not use lotion or powder near the puncture site.           Activity:        For 2 days:     do not use your hand or arm to support your weight (such as rising from a chair)     do not bend your wrist (such as lifting a garage door).    do not lift more than 5 pounds or exercise your arm (such as tennis, golf or bowling).    Do NOT do any heavy activity such as exercise, lifting, or straining.     Bleeding:      If you start bleeding from the site in your wrist, sit down and press firmly on/above the site for 10 minutes.     Once bleeding stops, keep arm still for 2 hours.     Call [facility] as soon as you can.       Call 911 right away if you have heavy bleeding or bleeding that does not stop.      Medicines:      If you are taking antiplatelet medications such as Plavix, Brilinta or Effient, do not stop taking it until you talk to your cardiologist.        Take your medications, including blood thinners, unless your provider tells you not to.  If you take Coumadin (Warfarin), have your INR checked by your provider in  3-5 days. Call your clinic to schedule  this.    If you have stopped any medicines, check with your provider about when to restart them.    Follow Up Appointments:      Follow up with [facility] Nurse Practitioner at [facility] of patient preference in 7-10 days.    Call the clinic if:      You have a large or growing hard lump around the site.    The site is red, swollen, hot or tender.    Blood or fluid is draining from the site.    You have chills or a fever greater than 101 F (38 C).    Your arm turns feels numb, cool or changes color.    You have hives, a rash or unusual itching.    Any questions or concerns.          [facility] [place]:    [phone] [facility] (7 days a week)

## 2018-01-31 NOTE — PROGRESS NOTES
Discharge instructions reviewed with the patient, questions answered, patient expresses understanding and a copy given to the patient.  The signature page placed in the paper chart.  Patient up to the bathroom, ambulating well and +void.

## 2018-01-31 NOTE — IP AVS SNAPSHOT
MRN:[id number]                      After Visit Summary   1/31/2018    Keith Corea    MRN: [id number]           Visit Information        Department      1/31/2018  8:32 AM [facility]          Review of your medicines      UNREVIEWED medicines. Ask your doctor about these medicines        Dose / Directions    ALLEGRA ALLERGY PO        Take by mouth as needed   Refills:  0       aspirin 81 MG EC tablet   Used for:  Coronary artery disease involving native coronary artery of native heart without angina pectoris        Dose:  81 mg   Take 1 tablet (81 mg) by mouth daily   Quantity:  1 tablet   Refills:  0       atorvastatin 80 MG tablet   Commonly known as:  LIPITOR   Used for:  CAD S/P percutaneous coronary angioplasty        Dose:  80 mg   Take 1 tablet (80 mg) by mouth daily   Quantity:  90 tablet   Refills:  3       metoprolol succinate 25 MG 24 hr tablet   Commonly known as:  TOPROL-XL   Used for:  Chest pain, midsternal        Dose:  12.5 mg   Take 0.5 tablets (12.5 mg) by mouth daily   Quantity:  45 tablet   Refills:  3       nitroGLYcerin 0.4 MG sublingual tablet   Commonly known as:  NITROSTAT   Used for:  CAD (coronary artery disease)        Dose:  0.4 mg   Place 1 tablet (0.4 mg) under the tongue every 5 minutes as needed for chest pain If you are still having symptoms after 3 doses (15 minutes) call 911.   Quantity:  25 tablet   Refills:  2         START taking        Dose / Directions    ticagrelor 90 MG tablet   Commonly known as:  BRILINTA   Used for:  Coronary artery disease involving native coronary artery of native heart, angina presence unspecified, Status post coronary angioplasty        Dose:  90 mg   Take 1 tablet (90 mg) by mouth 2 times daily Start this evening.   Quantity:  180 tablet   Refills:  3            Where to get your medicines      Some of these will need a paper prescription and others can be bought over the counter. Ask your nurse if you  have questions.     Bring a paper prescription for each of these medications     ticagrelor 90 MG tablet               Prescriptions were sent or printed at these locations (1 Prescription)                   Other Prescriptions                Printed at Department/Unit printer (1 of 1)         ticagrelor (BRILINTA) 90 MG tablet                 Protect others around you: Learn how to safely use, store and throw away your medicines at www.disposemymeds.org.         Follow-ups after your visit        Additional Services     CARDIAC REHAB REFERRAL       Please be aware that coverage of these services is subject to the terms and limitations of your health insurance plan.  Call member services at your health plan with any benefit or coverage questions.     Order is sent electronically to central rehab scheduling. Call [phone] if you haven't been contacted regarding these appointments within 2 business days of discharge.                  Your next 10 appointments already scheduled     Feb 21, 2018  8:00 AM CST   Return Visit with TON Verma   [facility] ([facility])    [address] [place] [id number] [phone]               Care Instructions        After Care Instructions     Discharge Instructions - Follow up with [facility] Cardiologist       Follow -up with the [facility] Clinic of patient preference in 2-4 weeks            Discharge Instructions - Follow up with [facility] Nurse Practitioner        Follow up with [facility] Nurse Practitioner at [facility] of patient preference in 7-10 days.            Discharge Instructions - IF on Metformin (Glucophage or Glucovance) or Metformin containing medications       IF on Metformin (Glucophage or Glucovance) or Metformin containing medications , schedule a Basic Metabolic Panel at [facility] or Primary Clinic in 48 - 72 hours post procedure and PRIOR TO resuming the Metformin or Metformin  containing medications.  Hold Metformin (Glucophage or Glucovance) or Metformin containing medications until after the Basic Metabolic Panel on the 2nd or 3rd day following the procedure.  May resume after blood draw is complete.                  Further instructions from your care team       Cardiac Angiogram Discharge Instructions - Radial    After you go home:      Have an adult stay with you until tomorrow.    Drink extra fluids for 2 days.    You may resume your normal diet.    No smoking       For 24 hours - due to the sedation you received:    Relax and take it easy.    Do NOT make any important or legal decisions.    Do NOT drive or operate machines at home or at work.    Do NOT drink alcohol.    Care of Wrist Puncture Site:      For the first 24 hrs - check the puncture site every 1-2 hours while awake.    It is normal to have soreness at the puncture site and mild tingling in your hand for up to 3 days.    Remove the bandaid after 24 hours. If there is minor oozing, apply another bandaid and remove it after 12 hours.    You may shower tomorrow.  Do NOT take a bath, or use a hot tub or pool for at least 3 days. Do NOT scrub the site. Do not use lotion or powder near the puncture site.           Activity:        For 2 days:     do not use your hand or arm to support your weight (such as rising from a chair)     do not bend your wrist (such as lifting a garage door).    do not lift more than 5 pounds or exercise your arm (such as tennis, golf or bowling).    Do NOT do any heavy activity such as exercise, lifting, or straining.     Bleeding:      If you start bleeding from the site in your wrist, sit down and press firmly on/above the site for 10 minutes.     Once bleeding stops, keep arm still for 2 hours.     Call [facility] as soon as you can.       Call 911 right away if you have heavy bleeding or bleeding that does not stop.      Medicines:      If you are taking antiplatelet medications such as Plavix,  "Brilinta or Effient, do not stop taking it until you talk to your cardiologist.        Take your medications, including blood thinners, unless your provider tells you not to.  If you take Coumadin (Warfarin), have your INR checked by your provider in  3-5 days. Call your clinic to schedule this.    If you have stopped any medicines, check with your provider about when to restart them.    Follow Up Appointments:      Follow up with [facility] Nurse Practitioner at [facility] Clinic of patient preference in 7-10 days.    Call the clinic if:      You have a large or growing hard lump around the site.    The site is red, swollen, hot or tender.    Blood or fluid is draining from the site.    You have chills or a fever greater than 101 F (38 C).    Your arm turns feels numb, cool or changes color.    You have hives, a rash or unusual itching.    Any questions or concerns.          [facility]:    [phone] [facility] (7 days a week)             Additional Information About Your Visit        [employer] Information     [employer] lets you send messages to your doctor, view your test results, renew your prescriptions, schedule appointments and more. To sign up, go to [email or web address] . Click on \"Log in\" on the left side of the screen, which will take you to the Welcome page. Then click on \"Sign up Now\" on the right side of the page.     You will be asked to enter the access code listed below, as well as some personal information. Please follow the directions to create your username and password.     Your access code is: [email or web address]  Expires: 2018  8:14 AM     Your access code will  in 90 days. If you need help or a new code, please call your [facility] clinic or [phone].        Care EveryWhere ID     This is your Care EveryWhere ID. This could be used by other organizations to access your [facility] medical records  [id number]        Your Vitals Were     Blood Pressure " "Pulse Temperature Respirations Height Weight    134/120 50 97  F (36.1  C) (Oral) 16 1.753 m (5' 9.02\") 85.2 kg (187 lb 12.8 oz)    Pulse Oximetry BMI (Body Mass Index)                97% 27.72 kg/m2           Primary Care Provider Office Phone # Fax #    NAYA Chiu [phone]      Equal Access to Services     BRANDI [place] : Hadii aad ku hadasho Soomaali, waaxda luqadaha, qaybta kaalmada adeegyada, waxay idiin hayaan adeeg khsánchezsh jeff . So [facility] [phone].    ATENCIÓN: Si carl blackburn, tiene a rivera disposición servicios gratuitos de asistencia lingüística. Llame al [phone].    We comply with applicable federal civil rights laws and [place] laws. We do not discriminate on the basis of race, color, national origin, age, disability, sex, sexual orientation, or gender identity.            Thank you!     Thank you for choosing [facility] for your care. Our goal is always to provide you with excellent care. Hearing back from our patients is one way we can continue to improve our services. Please take a few minutes to complete the written survey that you may receive in the mail after you visit with us. Thank you!             Medication List: This is a list of all your medications and when to take them. Check marks below indicate your daily home schedule. Keep this list as a reference.      Medications           Morning Afternoon Evening Bedtime As Needed    ALLEGRA ALLERGY PO   Take by mouth as needed                                aspirin 81 MG EC tablet   Take 1 tablet (81 mg) by mouth daily                                atorvastatin 80 MG tablet   Commonly known as:  LIPITOR   Take 1 tablet (80 mg) by mouth daily                                metoprolol succinate 25 MG 24 hr tablet   Commonly known as:  TOPROL-XL   Take 0.5 tablets (12.5 mg) by mouth daily                                nitroGLYcerin 0.4 MG sublingual tablet   Commonly known as:  NITROSTAT   Place 1 tablet (0.4 mg) " under the tongue every 5 minutes as needed for chest pain If you are still having symptoms after 3 doses (15 minutes) call 911.                                ticagrelor 90 MG tablet   Commonly known as:  BRILINTA   Take 1 tablet (90 mg) by mouth 2 times daily Start this evening.   Last time this was given:  180 mg on 1/31/2018 11:37 AM

## 2018-02-01 ENCOUNTER — TELEPHONE (OUTPATIENT)
Dept: CARDIOLOGY | Facility: CLINIC | Age: 63
End: 2018-02-01

## 2018-02-01 LAB
INTERPRETATION ECG - MUSE: NORMAL
INTERPRETATION ECG - MUSE: NORMAL
KCT BLD-ACNC: 254 SEC (ref 105–167)

## 2018-02-01 NOTE — TELEPHONE ENCOUNTER
Left message for patient post discharge from care suites after an angioplasty and stenting to the LAD with a DANIELA . Access was obtained via the right wrist. Planned to review wrist site instructions, activity instructions, and make sure he was able to  his medications. Left call back number for return call.     NAZANIN Gore PA-C 8:13 AM 2/1/2018

## 2018-02-21 ENCOUNTER — OFFICE VISIT (OUTPATIENT)
Dept: CARDIOLOGY | Facility: CLINIC | Age: 63
End: 2018-02-21
Payer: COMMERCIAL

## 2018-02-21 VITALS
WEIGHT: 178.3 LBS | SYSTOLIC BLOOD PRESSURE: 122 MMHG | DIASTOLIC BLOOD PRESSURE: 74 MMHG | HEIGHT: 69 IN | HEART RATE: 48 BPM | BODY MASS INDEX: 26.41 KG/M2

## 2018-02-21 DIAGNOSIS — I25.10 CORONARY ARTERY DISEASE INVOLVING NATIVE CORONARY ARTERY OF NATIVE HEART, ANGINA PRESENCE UNSPECIFIED: ICD-10-CM

## 2018-02-21 DIAGNOSIS — I25.10 CORONARY ARTERY DISEASE INVOLVING NATIVE CORONARY ARTERY OF NATIVE HEART WITHOUT ANGINA PECTORIS: Primary | ICD-10-CM

## 2018-02-21 DIAGNOSIS — Z98.61 CAD S/P PERCUTANEOUS CORONARY ANGIOPLASTY: ICD-10-CM

## 2018-02-21 DIAGNOSIS — Z98.61 STATUS POST CORONARY ANGIOPLASTY: ICD-10-CM

## 2018-02-21 DIAGNOSIS — E78.00 PURE HYPERCHOLESTEROLEMIA: ICD-10-CM

## 2018-02-21 DIAGNOSIS — I25.10 CAD S/P PERCUTANEOUS CORONARY ANGIOPLASTY: ICD-10-CM

## 2018-02-21 PROCEDURE — 99213 OFFICE O/P EST LOW 20 MIN: CPT | Performed by: PHYSICIAN ASSISTANT

## 2018-02-21 RX ORDER — NITROGLYCERIN 0.4 MG/1
0.4 TABLET SUBLINGUAL EVERY 5 MIN PRN
Qty: 25 TABLET | Refills: 2 | Status: SHIPPED | OUTPATIENT
Start: 2018-02-21 | End: 2019-03-04

## 2018-02-21 NOTE — PROGRESS NOTES
Cardiology Progress Note    Date of Service: 02/21/2018      Reason for visit:  Coronary artery disease, f/u after angiogram.    Primary cardiologist: Dr. Amandeep Clark      HPI:  Mr. Corea is a pleasant 62-year-old gentleman followed by Dr. Clark with a PMHx of known CAD, s/p stenting of a 95% lesion in the ramus intermedius in 09/2012 for exertional angina.  He also had a 70%-75% lesion in the LAD which had been followed by serial stress testing.  His most recent stress echocardiogram in 2015 had demonstrated no evidence of ischemia. He saw Dr. Clark back last month for routine follow up and was feeling well without angina and has remained active. In fact, he is planning to ski race in the near future. Thus, given his moderate LAD, it was felt reasonable to repeat a stress test and lipid panel for evaluation. This stress test showed a small anterolateral apical defect consistent with ischemia. He had mild global hypokinesis with an EF estimated at 43% at rest and stress. There was also a noted hypertensive response to exercise. This was reviewed by Dr. Clark, and give his anatomy and plans for race as above, it was recommended he undergo coronary angiography. His repeat FLP showed acceptable control with an LDL of 70. His HDL is 50, and TG are 82, and he remained on statin medication without any significant side effects.    His angiogram was performed 1/31/18, and he was found to have a long eccentric narrowing in the LAD that was 75-80% stenosis. The FFR was 0.63. He then underwent successful PCI to the mid LAD with a 2.75 x 38mm everolimus eluting Synergy stent, post dilated to 3.0mm. He is here today for follow up. Overall, he is feeling well. He was placed on ticagrelor in addition to his ASA which he is tolerating well without evidence of bleeding or other new side effects. His wrist site is well healed, no issues. He has been cross country skiing daily in preparation for his race and has no new issues. He denies  chest pain, palpitations, dizziness, orthopnea, PND, or LE edema. In fact, he thinks he has even more energy than he did before. He is pleased with his overall results.     His only question for me today if he may possibly have Raynaud's disease. His finger tips and toes get cold, white, and sometimes numb while he is outside in the winter doing his activities. The heat packs work sometimes, but not always. He has not had any open ulcers related to this. I've asked him to contact his PCP regarding this for further evaluation.    ASSESSMENT/PLAN:      1. Coronary artery disease.       --Hx of drug-eluting stent placement to the ramus intermedius in 10/2012 and known prior moderate LAD disease that had not been found to be flow-limiting in the past.    --Treadmill stress test done 1/23/18 showed small anterolateral apical defect consistent with ischemia. There was mild global hypokinesis with an EF estimated at 43%, and a noted hypertensive response to exercise. Repeat angiogram 1/31/18 showed a long eccentric narrowing in the LAD that was 75-80% stenosis. The FFR was 0.63. He then underwent successful PCI to the mid LAD with a 2.75 x 38mm everolimus eluting Synergy stent, post dilated to 3.0mm.    --Continue DAPT with ASA/Brilinta x 1 year then ASA indefinitely.    --Continue Toprol XL 12.5mg daily. His BP is well controlled currently, though he did have a hypertensive response to exercise on his stress test.    --Encouraged Mediterranean diet when able, though he admits he eats large amount of carbs while training for a race. He is very active and exercises daily.     2. Dyslipidemia.   --LDL 1/2018 70. Continue atorvastatin which is optimized at 80mg daily.     Follow up plan:  With Dr. Amandeep Clark in 1 year, or sooner if needed.       Orders this Visit:  Orders Placed This Encounter   Procedures     Follow-Up with Cardiologist     Orders Placed This Encounter   Medications     ticagrelor (BRILINTA) 90 MG tablet      Sig: Take 1 tablet (90 mg) by mouth 2 times daily Start this evening.     Dispense:  180 tablet     Refill:  3     nitroGLYcerin (NITROSTAT) 0.4 MG sublingual tablet     Sig: Place 1 tablet (0.4 mg) under the tongue every 5 minutes as needed for chest pain If symptoms persist call 911     Dispense:  25 tablet     Refill:  2     Medications Discontinued During This Encounter   Medication Reason     ticagrelor (BRILINTA) 90 MG tablet Reorder     nitroglycerin (NITROSTAT) 0.4 MG SL tablet Reorder           CURRENT MEDICATIONS:  Current Outpatient Prescriptions   Medication Sig Dispense Refill     ticagrelor (BRILINTA) 90 MG tablet Take 1 tablet (90 mg) by mouth 2 times daily Start this evening. 180 tablet 3     nitroGLYcerin (NITROSTAT) 0.4 MG sublingual tablet Place 1 tablet (0.4 mg) under the tongue every 5 minutes as needed for chest pain If symptoms persist call 911 25 tablet 2     aspirin 81 MG EC tablet Take 1 tablet (81 mg) by mouth daily 1 tablet 0     metoprolol (TOPROL-XL) 25 MG 24 hr tablet Take 0.5 tablets (12.5 mg) by mouth daily 45 tablet 3     atorvastatin (LIPITOR) 80 MG tablet Take 1 tablet (80 mg) by mouth daily 90 tablet 3     Fexofenadine HCl (ALLEGRA ALLERGY PO) Take by mouth as needed        [DISCONTINUED] ticagrelor (BRILINTA) 90 MG tablet Take 1 tablet (90 mg) by mouth 2 times daily Start this evening. 180 tablet 3     [DISCONTINUED] nitroglycerin (NITROSTAT) 0.4 MG SL tablet Place 1 tablet (0.4 mg) under the tongue every 5 minutes as needed for chest pain If you are still having symptoms after 3 doses (15 minutes) call 911. 25 tablet 2       ALLERGIES     Allergies   Allergen Reactions     Seasonal Allergies        PAST MEDICAL HISTORY:  Past Medical History:   Diagnosis Date     Allergic rhinitis, cause unspecified      CAD (coronary artery disease) 10/2012    95% narrowing ramus intermedius     Contact dermatitis and other eczema, due to unspecified cause      Hyperlipidemia LDL goal <70      mixed     Impaired fasting glucose      Mixed hyperlipidemia      Sleep apnea        PAST SURGICAL HISTORY:  Past Surgical History:   Procedure Laterality Date     CL AFF SURGICAL PATHOLOGY      lipoma excision, left trunk     COLONOSCOPY N/A 2017    Procedure: COLONOSCOPY;  COLONOSCOPY;  Surgeon: Michael Kramer MD;  Location:  GI     ESOPHAGOSCOPY, GASTROSCOPY, DUODENOSCOPY (EGD), COMBINED  10/21/2011    Procedure:COMBINED ESOPHAGOSCOPY, GASTROSCOPY, DUODENOSCOPY (EGD), BIOPSY SINGLE OR MULTIPLE; Surgeon:MATTHEW SALMERON; Location: GI     HC COLONOSCOPY THRU STOMA, DIAGNOSTIC      NL, repeat in 10 years     HEART CATH, ANGIOPLASTY  10-01-12    PTCA and implantation of 3.0 x 18 mm length Medtronic Resolute zotarolimus-eluting stent in the mid segment of the ramus intermedius branch      HEART CATH, ANGIOPLASTY  2018    DANIELA to LAD     VASECTOMY         FAMILY HISTORY:  Family History   Problem Relation Age of Onset     Lipids Mother      CANCER Mother      lung, smoker     Lipids Father      Neurologic Disorder Brother      syncope     HEART DISEASE Maternal Grandfather       of MI     Connective Tissue Disorder Daughter      Sjogren's syndrome       SOCIAL HISTORY:  Social History     Social History     Marital status:      Spouse name: N/A     Number of children: N/A     Years of education: N/A     Social History Main Topics     Smoking status: Never Smoker     Smokeless tobacco: Never Used     Alcohol use Yes      Comment: 2 beer per day     Drug use: No     Sexual activity: Yes     Partners: Female     Other Topics Concern     Caffeine Concern No     2 pops a day     Sleep Concern No     Stress Concern No     Weight Concern No     Special Diet No     Exercise Yes     cross country skiing, biking      Bike Helmet Yes     Seat Belt Yes     Social History Narrative       Review of Systems:  Cardiovascular: negative for chest pain, palpitations, orthopnea, LE  "edema  Constitutional: negative for chills, sweats, fevers   Resp: Negative for dyspnea at rest, dyspnea on exertion, cough, wheezing, known chronic lung disease  HEENT: Negative for new visual changes, frequent headaches  Gastrointestinal: negative for abdominal pain, diarrhea, nausea, vomiting  Hematologic/lymphatic: negative for current systemic anticoagulation, hx of blood clots  Musculoskeletal: negative for new back pain, joint pain  Neurological: negative for focal weakness, LOC, seizures, syncope      Physical Exam:  Vitals: /74  Pulse (!) 48  Ht 1.753 m (5' 9\")  Wt 80.9 kg (178 lb 4.8 oz)  BMI 26.33 kg/m2   Wt Readings from Last 4 Encounters:   02/21/18 80.9 kg (178 lb 4.8 oz)   01/31/18 85.2 kg (187 lb 12.8 oz)   01/31/18 85.2 kg (187 lb 12.8 oz)   01/19/18 82.6 kg (182 lb)       GEN:  In general, this is a well nourished  male in no acute distress on room air.  Patient ambulatory.  HEENT:  Pupils equal, round. Sclerae nonicteric. Clear oropharynx. Mucous membranes moist.  NECK: Supple, no masses appreciated. Trachea midline. No JVD.  C/V:  Regular rate and rhythm, no murmur, rub or gallop.   RESP: Respirations are unlabored. No use of accessory muscles. Clear to auscultation bilaterally without wheezing, rales, or rhonchi.  GI: Abdomen soft, nontender, nondistended.   EXTREM: No LE edema. No cyanosis or clubbing.  NEURO: Alert and oriented, cooperative. Gait normal. No obvious focal deficits.   PSYCH: Normal affect.  SKIN: Warm and dry. No rashes or petechiae appreciated.       Recent Lab Results:  LIPID RESULTS:  Lab Results   Component Value Date    CHOL 136 01/19/2018    HDL 50 01/19/2018    LDL 70 01/19/2018    TRIG 82 01/19/2018       New/Pertinent imaging results:    1/23/18 Exercise Nuc  Impression  1.  Myocardial perfusion imaging using single isotope technique  demonstrated a small anterolateral apical defect consistent with  ischemia. There is a small area of " anterior/anteroseptal ischemia  towards the base . Heterogeneous tracer uptake may reduce accuracy of  interpretation  2. Gated images demonstrated mild global hypokinesis.  The left  ventricular systolic function is 43% at rest and post stress.  3. Compared to the prior study from 1/21/2014, patchy areas of  decreased uptake in the anterior and anteroseptal septum and  inferoseptal walls were noted that appeared fixed .  4. Hypertensive response to exercise      Joy Calhoun PA-C  [facility]  Pager [phone]

## 2018-02-21 NOTE — MR AVS SNAPSHOT
After Visit Summary   2/21/2018    Keith Corea    MRN: [id number]           Patient Information     Date Of Birth          1955        Visit Information        Provider Department      2/21/2018 8:00 AM Joy Calhoun PA [facility]        Today's Diagnoses     Coronary artery disease involving native coronary artery of native heart without angina pectoris    -  1    Pure hypercholesterolemia        CAD S/P percutaneous coronary angioplasty        Coronary artery disease involving native coronary artery of native heart, angina presence unspecified        Status post coronary angioplasty          Care Instructions    Visit Summary:    Today we discussed:   We reviewed the results of your angiogram.     Medication changes:    NONE    Follow up:   1 year with Dr. Clark                Follow-ups after your visit        Additional Services     Follow-Up with Cardiologist                 Future tests that were ordered for you today     Open Future Orders        Priority Expected Expires Ordered    Follow-Up with Cardiologist Routine 2/21/2019 2/22/2019 2/21/2018            Who to contact     If you have questions or need follow up information about today's clinic visit or your schedule please contact [facility] directly at [phone].  Normal or non-critical lab and imaging results will be communicated to you by MyChart, letter or phone within 4 business days after the clinic has received the results. If you do not hear from us within 7 days, please contact the clinic through MyChart or phone. If you have a critical or abnormal lab result, we will notify you by phone as soon as possible.  Submit refill requests through [employer] or call your pharmacy and they will forward the refill request to us. Please allow 3 business days for your refill to be completed.          Additional Information About Your Visit        MyChart  "Information     [facility] lets you send messages to your doctor, view your test results, renew your prescriptions, schedule appointments and more. To sign up, go to [email or web address] . Click on \"Log in\" on the left side of the screen, which will take you to the Welcome page. Then click on \"Sign up Now\" on the right side of the page.     You will be asked to enter the access code listed below, as well as some personal information. Please follow the directions to create your username and password.     Your access code is: [email or web address]  Expires: 2018  8:14 AM     Your access code will  in 90 days. If you need help or a new code, please call your [facility] clinic or [phone].        Care EveryWhere ID     This is your Care EveryWhere ID. This could be used by other organizations to access your [facility] medical records  [id number]        Your Vitals Were     Pulse Height BMI (Body Mass Index)             48 1.753 m (5' 9\") 26.33 kg/m2          Blood Pressure from Last 3 Encounters:   18 122/74   18 128/67   18 104/68    Weight from Last 3 Encounters:   18 80.9 kg (178 lb 4.8 oz)   18 85.2 kg (187 lb 12.8 oz)   18 85.2 kg (187 lb 12.8 oz)                 Today's Medication Changes          These changes are accurate as of 18  8:32 AM.  If you have any questions, ask your nurse or doctor.               These medicines have changed or have updated prescriptions.        Dose/Directions    nitroGLYcerin 0.4 MG sublingual tablet   Commonly known as:  NITROSTAT   This may have changed:  additional instructions   Used for:  Coronary artery disease involving native coronary artery of native heart without angina pectoris   Changed by:  Joy Calhoun PA        Dose:  0.4 mg   Place 1 tablet (0.4 mg) under the tongue every 5 minutes as needed for chest pain If symptoms persist call 911   Quantity:  25 tablet   Refills:  2            Where to get your medicines      These " medications were sent to [id number] - [place], [place] - [address] AT [facility] [address] [place] [address]  [address], [place] [address]     Phone:  [phone]     nitroGLYcerin 0.4 MG sublingual tablet    ticagrelor 90 MG tablet                Primary Care Provider Office Phone # Fax #    NAYA Chiu [phone]       [address]  [place] [address]        Equal Access to Services     [facility]: Hadii aad ku hadasho Soomaali, waaxda luqadaha, qaybta kaalmada adeegyada, waxay idiin hayaan adecayla guthriearacharles aguilar . So [facility] [phone].    ATENCIÓN: Si habla español, tiene a rivera disposición servicios gratuitos de asistencia lingüística. Ll[facility]leveland [facility] Foundation [phone].    We comply with applicable federal civil rights laws and [place] laws. We do not discriminate on the basis of race, color, national origin, age, disability, sex, sexual orientation, or gender identity.            Thank you!     Thank you for choosing [facility]  for your care. Our goal is always to provide you with excellent care. Hearing back from our patients is one way we can continue to improve our services. Please take a few minutes to complete the written survey that you may receive in the mail after your visit with us. Thank you!             Your Updated Medication List - Protect others around you: Learn how to safely use, store and throw away your medicines at www.disposemymeds.org.          This list is accurate as of 2/21/18  8:32 AM.  Always use your most recent med list.                   Brand Name Dispense Instructions for use Diagnosis    ALLEGRA ALLERGY PO      Take by mouth as needed        aspirin 81 MG EC tablet     1 tablet    Take 1 tablet (81 mg) by mouth daily    Coronary artery disease involving native coronary artery of native heart without angina pectoris       atorvastatin 80 MG tablet    LIPITOR    90 tablet    Take 1 tablet  (80 mg) by mouth daily    CAD S/P percutaneous coronary angioplasty       metoprolol succinate 25 MG 24 hr tablet    TOPROL-XL    45 tablet    Take 0.5 tablets (12.5 mg) by mouth daily    Chest pain, midsternal       nitroGLYcerin 0.4 MG sublingual tablet    NITROSTAT    25 tablet    Place 1 tablet (0.4 mg) under the tongue every 5 minutes as needed for chest pain If symptoms persist call 911    Coronary artery disease involving native coronary artery of native heart without angina pectoris       ticagrelor 90 MG tablet    BRILINTA    180 tablet    Take 1 tablet (90 mg) by mouth 2 times daily Start this evening.    Coronary artery disease involving native coronary artery of native heart, angina presence unspecified, Status post coronary angioplasty

## 2018-02-21 NOTE — PATIENT INSTRUCTIONS
Visit Summary:    Today we discussed:   We reviewed the results of your angiogram.     Medication changes:    NONE    Follow up:   1 year with Dr. Clark

## 2018-02-21 NOTE — LETTER
2/21/2018    Amy Guzman, APRN CNP  [address]  [place] [address]    RE: Keith KRISHNAN Anmol       Dear Colleague,    I had the pleasure of seeing Keith Corea in the [facility].      Cardiology Progress Note    Date of Service: 02/21/2018      Reason for visit:  Coronary artery disease, f/u after angiogram.    Primary cardiologist: Dr. Amandeep Clark      HPI:  Mr. Corea is a pleasant 62-year-old gentleman followed by Dr. Clark with a PMHx of known CAD, s/p stenting of a 95% lesion in the ramus intermedius in 09/2012 for exertional angina.  He also had a 70%-75% lesion in the LAD which had been followed by serial stress testing.  His most recent stress echocardiogram in 2015 had demonstrated no evidence of ischemia. He saw Dr. Clark back last month for routine follow up and was feeling well without angina and has remained active. In fact, he is planning to ski race in the near future. Thus, given his moderate LAD, it was felt reasonable to repeat a stress test and lipid panel for evaluation. This stress test showed a small anterolateral apical defect consistent with ischemia. He had mild global hypokinesis with an EF estimated at 43% at rest and stress. There was also a noted hypertensive response to exercise. This was reviewed by Dr. Clark, and give his anatomy and plans for race as above, it was recommended he undergo coronary angiography. His repeat FLP showed acceptable control with an LDL of 70. His HDL is 50, and TG are 82, and he remained on statin medication without any significant side effects.    His angiogram was performed 1/31/18, and he was found to have a long eccentric narrowing in the LAD that was 75-80% stenosis. The FFR was 0.63. He then underwent successful PCI to the mid LAD with a 2.75 x 38mm everolimus eluting Synergy stent, post dilated to 3.0mm. He is here today for follow up. Overall, he is feeling well. He was placed on ticagrelor in addition to his ASA  which he is tolerating well without evidence of bleeding or other new side effects. His wrist site is well healed, no issues. He has been cross country skiing daily in preparation for his race and has no new issues. He denies chest pain, palpitations, dizziness, orthopnea, PND, or LE edema. In fact, he thinks he has even more energy than he did before. He is pleased with his overall results.     His only question for me today if he may possibly have Raynaud's disease. His finger tips and toes get cold, white, and sometimes numb while he is outside in the winter doing his activities. The heat packs work sometimes, but not always. He has not had any open ulcers related to this. I've asked him to contact his PCP regarding this for further evaluation.    ASSESSMENT/PLAN:      1. Coronary artery disease.       --Hx of drug-eluting stent placement to the ramus intermedius in 10/2012 and known prior moderate LAD disease that had not been found to be flow-limiting in the past.    --Treadmill stress test done 1/23/18 showed small anterolateral apical defect consistent with ischemia. There was mild global hypokinesis with an EF estimated at 43%, and a noted hypertensive response to exercise. Repeat angiogram 1/31/18 showed a long eccentric narrowing in the LAD that was 75-80% stenosis. The FFR was 0.63. He then underwent successful PCI to the mid LAD with a 2.75 x 38mm everolimus eluting Synergy stent, post dilated to 3.0mm.    --Continue DAPT with ASA/Brilinta x 1 year then ASA indefinitely.    --Continue Toprol XL 12.5mg daily. His BP is well controlled currently, though he did have a hypertensive response to exercise on his stress test.    --Encouraged Mediterranean diet when able, though he admits he eats large amount of carbs while training for a race. He is very active and exercises daily.     2. Dyslipidemia.   --LDL 1/2018 70. Continue atorvastatin which is optimized at 80mg daily.     Follow up plan:  With Dr. Bernstein  Amber in 1 year, or sooner if needed.       Orders this Visit:  Orders Placed This Encounter   Procedures     Follow-Up with Cardiologist     Orders Placed This Encounter   Medications     ticagrelor (BRILINTA) 90 MG tablet     Sig: Take 1 tablet (90 mg) by mouth 2 times daily Start this evening.     Dispense:  180 tablet     Refill:  3     nitroGLYcerin (NITROSTAT) 0.4 MG sublingual tablet     Sig: Place 1 tablet (0.4 mg) under the tongue every 5 minutes as needed for chest pain If symptoms persist call 911     Dispense:  25 tablet     Refill:  2     Medications Discontinued During This Encounter   Medication Reason     ticagrelor (BRILINTA) 90 MG tablet Reorder     nitroglycerin (NITROSTAT) 0.4 MG SL tablet Reorder           CURRENT MEDICATIONS:  Current Outpatient Prescriptions   Medication Sig Dispense Refill     ticagrelor (BRILINTA) 90 MG tablet Take 1 tablet (90 mg) by mouth 2 times daily Start this evening. 180 tablet 3     nitroGLYcerin (NITROSTAT) 0.4 MG sublingual tablet Place 1 tablet (0.4 mg) under the tongue every 5 minutes as needed for chest pain If symptoms persist call 911 25 tablet 2     aspirin 81 MG EC tablet Take 1 tablet (81 mg) by mouth daily 1 tablet 0     metoprolol (TOPROL-XL) 25 MG 24 hr tablet Take 0.5 tablets (12.5 mg) by mouth daily 45 tablet 3     atorvastatin (LIPITOR) 80 MG tablet Take 1 tablet (80 mg) by mouth daily 90 tablet 3     Fexofenadine HCl (ALLEGRA ALLERGY PO) Take by mouth as needed        [DISCONTINUED] ticagrelor (BRILINTA) 90 MG tablet Take 1 tablet (90 mg) by mouth 2 times daily Start this evening. 180 tablet 3     [DISCONTINUED] nitroglycerin (NITROSTAT) 0.4 MG SL tablet Place 1 tablet (0.4 mg) under the tongue every 5 minutes as needed for chest pain If you are still having symptoms after 3 doses (15 minutes) call 911. 25 tablet 2       ALLERGIES     Allergies   Allergen Reactions     Seasonal Allergies        PAST MEDICAL HISTORY:  Past Medical History:   Diagnosis  Date     Allergic rhinitis, cause unspecified      CAD (coronary artery disease) 10/2012    95% narrowing ramus intermedius     Contact dermatitis and other eczema, due to unspecified cause      Hyperlipidemia LDL goal <70     mixed     Impaired fasting glucose      Mixed hyperlipidemia      Sleep apnea        PAST SURGICAL HISTORY:  Past Surgical History:   Procedure Laterality Date     CL AFF SURGICAL PATHOLOGY      lipoma excision, left trunk     COLONOSCOPY N/A 2017    Procedure: COLONOSCOPY;  COLONOSCOPY;  Surgeon: Michael Kramer MD;  Location:  GI     ESOPHAGOSCOPY, GASTROSCOPY, DUODENOSCOPY (EGD), COMBINED  10/21/2011    Procedure:COMBINED ESOPHAGOSCOPY, GASTROSCOPY, DUODENOSCOPY (EGD), BIOPSY SINGLE OR MULTIPLE; Surgeon:MATTHEW SALMERON; Location: GI     HC COLONOSCOPY THRU STOMA, DIAGNOSTIC      NL, repeat in 10 years     HEART CATH, ANGIOPLASTY  10-01-12    PTCA and implantation of 3.0 x 18 mm length Medtronic Resolute zotarolimus-eluting stent in the mid segment of the ramus intermedius branch      HEART CATH, ANGIOPLASTY  2018    DANIELA to LAD     VASECTOMY         FAMILY HISTORY:  Family History   Problem Relation Age of Onset     Lipids Mother      CANCER Mother      lung, smoker     Lipids Father      Neurologic Disorder Brother      syncope     HEART DISEASE Maternal Grandfather       of MI     Connective Tissue Disorder Daughter      Sjogren's syndrome       SOCIAL HISTORY:  Social History     Social History     Marital status:      Spouse name: N/A     Number of children: N/A     Years of education: N/A     Social History Main Topics     Smoking status: Never Smoker     Smokeless tobacco: Never Used     Alcohol use Yes      Comment: 2 beer per day     Drug use: No     Sexual activity: Yes     Partners: Female     Other Topics Concern     Caffeine Concern No     2 pops a day     Sleep Concern No     Stress Concern No     Weight Concern No     Special Diet No  "    Exercise Yes     cross country skiing, biking      Bike Helmet Yes     Seat Belt Yes     Social History Narrative       Review of Systems:  Cardiovascular: negative for chest pain, palpitations, orthopnea, LE edema  Constitutional: negative for chills, sweats, fevers   Resp: Negative for dyspnea at rest, dyspnea on exertion, cough, wheezing, known chronic lung disease  HEENT: Negative for new visual changes, frequent headaches  Gastrointestinal: negative for abdominal pain, diarrhea, nausea, vomiting  Hematologic/lymphatic: negative for current systemic anticoagulation, hx of blood clots  Musculoskeletal: negative for new back pain, joint pain  Neurological: negative for focal weakness, LOC, seizures, syncope      Physical Exam:  Vitals: /74  Pulse (!) 48  Ht 1.753 m (5' 9\")  Wt 80.9 kg (178 lb 4.8 oz)  BMI 26.33 kg/m2   Wt Readings from Last 4 Encounters:   02/21/18 80.9 kg (178 lb 4.8 oz)   01/31/18 85.2 kg (187 lb 12.8 oz)   01/31/18 85.2 kg (187 lb 12.8 oz)   01/19/18 82.6 kg (182 lb)       GEN:  In general, this is a well nourished  male in no acute distress on room air.  Patient ambulatory.  HEENT:  Pupils equal, round. Sclerae nonicteric. Clear oropharynx. Mucous membranes moist.  NECK: Supple, no masses appreciated. Trachea midline. No JVD.  C/V:  Regular rate and rhythm, no murmur, rub or gallop.   RESP: Respirations are unlabored. No use of accessory muscles. Clear to auscultation bilaterally without wheezing, rales, or rhonchi.  GI: Abdomen soft, nontender, nondistended.   EXTREM: No LE edema. No cyanosis or clubbing.  NEURO: Alert and oriented, cooperative. Gait normal. No obvious focal deficits.   PSYCH: Normal affect.  SKIN: Warm and dry. No rashes or petechiae appreciated.       Recent Lab Results:  LIPID RESULTS:  Lab Results   Component Value Date    CHOL 136 01/19/2018    HDL 50 01/19/2018    LDL 70 01/19/2018    TRIG 82 01/19/2018       New/Pertinent imaging " results:    1/23/18 Exercise Nuc  Impression  1.  Myocardial perfusion imaging using single isotope technique  demonstrated a small anterolateral apical defect consistent with  ischemia. There is a small area of anterior/anteroseptal ischemia  towards the base . Heterogeneous tracer uptake may reduce accuracy of  interpretation  2. Gated images demonstrated mild global hypokinesis.  The left  ventricular systolic function is 43% at rest and post stress.  3. Compared to the prior study from 1/21/2014, patchy areas of  decreased uptake in the anterior and anteroseptal septum and  inferoseptal walls were noted that appeared fixed .  4. Hypertensive response to exercise      Joy Calhoun PA-C  [facility]  Pager [phone]    Thank you for allowing me to participate in the care of your patient.    Sincerely,     TON Verma     [facility]

## 2018-04-20 DIAGNOSIS — R07.89 CHEST PAIN, MIDSTERNAL: ICD-10-CM

## 2018-04-20 RX ORDER — METOPROLOL SUCCINATE 25 MG/1
12.5 TABLET, EXTENDED RELEASE ORAL DAILY
Qty: 45 TABLET | Refills: 3 | Status: SHIPPED | OUTPATIENT
Start: 2018-04-20 | End: 2019-04-29

## 2018-05-25 DIAGNOSIS — I25.10 CAD S/P PERCUTANEOUS CORONARY ANGIOPLASTY: ICD-10-CM

## 2018-05-25 DIAGNOSIS — Z98.61 CAD S/P PERCUTANEOUS CORONARY ANGIOPLASTY: ICD-10-CM

## 2018-05-25 RX ORDER — ATORVASTATIN CALCIUM 80 MG/1
80 TABLET, FILM COATED ORAL DAILY
Qty: 90 TABLET | Refills: 3 | Status: SHIPPED | OUTPATIENT
Start: 2018-05-25 | End: 2019-06-04

## 2018-08-22 ENCOUNTER — TRANSFERRED RECORDS (OUTPATIENT)
Dept: HEALTH INFORMATION MANAGEMENT | Facility: CLINIC | Age: 63
End: 2018-08-22

## 2018-10-03 ENCOUNTER — TELEPHONE (OUTPATIENT)
Dept: CARDIOLOGY | Facility: CLINIC | Age: 63
End: 2018-10-03

## 2018-10-03 NOTE — TELEPHONE ENCOUNTER
Patient called wondering if he can stop Brilinta as his 1 year stent anniversary will not be until 1-31-19. Patient had percutaneous coronary intervention mid LAD: 2.75 x 38 mm length  everolimus eluting Synergy stent, post dilated to 3.0 on 1-31-18.  Patient due for annual Dr. Clark OV also in Jan 2019.  Patient will call this month for annual OV.

## 2018-11-07 ENCOUNTER — TELEPHONE (OUTPATIENT)
Dept: CARDIOLOGY | Facility: CLINIC | Age: 63
End: 2018-11-07

## 2018-11-07 NOTE — TELEPHONE ENCOUNTER
Call from staff of Dr. Gigi Sanders dental office ([phone]). Patient needs a tooth extraction for decayed tooth. They are asking for a hold plan for brilinta.  Reviewed with staff that patient had a stent placed January 2018 and anniversary date is not until January 2019. Patient is also on aspirin 81mg daily.  Staff with discuss further with Dr. Sanders and either call back or possibly page Dr. Clark tomorrow for personal discussion about med holds.   Will wait for dental office to call back with their hold request (either ASA or maybe brilinta if absolutely necessary)

## 2018-12-05 ENCOUNTER — TRANSFERRED RECORDS (OUTPATIENT)
Dept: HEALTH INFORMATION MANAGEMENT | Facility: CLINIC | Age: 63
End: 2018-12-05

## 2019-02-22 ENCOUNTER — CARE COORDINATION (OUTPATIENT)
Dept: CARDIOLOGY | Facility: CLINIC | Age: 64
End: 2019-02-22

## 2019-02-22 DIAGNOSIS — E78.00 PURE HYPERCHOLESTEROLEMIA: Primary | ICD-10-CM

## 2019-02-22 NOTE — PROGRESS NOTES
Called to patient and left message to call back to schedule fasting lipids prior to annual visit 3/4/2019.   Rosemarie Adkins RN 02/22/19 2:34 PM      
Spontaneous, unlabored and symmetrical

## 2019-02-26 ENCOUNTER — PRE VISIT (OUTPATIENT)
Dept: CARDIOLOGY | Facility: CLINIC | Age: 64
End: 2019-02-26

## 2019-02-26 NOTE — PROGRESS NOTES
Call to patient - he has his lipids checked yearly at his work with [employer] and will bring labs with him to visit.  Rosemarie Adkins RN 02/26/19 4:55 PM

## 2019-03-04 ENCOUNTER — OFFICE VISIT (OUTPATIENT)
Dept: CARDIOLOGY | Facility: CLINIC | Age: 64
End: 2019-03-04
Payer: COMMERCIAL

## 2019-03-04 VITALS
SYSTOLIC BLOOD PRESSURE: 124 MMHG | HEART RATE: 49 BPM | WEIGHT: 184 LBS | HEIGHT: 69 IN | DIASTOLIC BLOOD PRESSURE: 72 MMHG | BODY MASS INDEX: 27.25 KG/M2

## 2019-03-04 DIAGNOSIS — E78.00 PURE HYPERCHOLESTEROLEMIA: ICD-10-CM

## 2019-03-04 DIAGNOSIS — I25.10 CORONARY ARTERY DISEASE INVOLVING NATIVE CORONARY ARTERY OF NATIVE HEART WITHOUT ANGINA PECTORIS: Primary | ICD-10-CM

## 2019-03-04 PROCEDURE — 99213 OFFICE O/P EST LOW 20 MIN: CPT | Performed by: PHYSICIAN ASSISTANT

## 2019-03-04 RX ORDER — NITROGLYCERIN 0.4 MG/1
0.4 TABLET SUBLINGUAL EVERY 5 MIN PRN
Qty: 20 TABLET | Refills: 2 | Status: SHIPPED | OUTPATIENT
Start: 2019-03-04 | End: 2021-04-08

## 2019-03-04 ASSESSMENT — MIFFLIN-ST. JEOR: SCORE: 1630

## 2019-03-04 NOTE — LETTER
3/4/2019    Amy Guzman, APRN CNP  [address]  [place] [address]    RE: Keith Longoriaishmael       Dear Colleague,    I had the pleasure of seeing Keith Corea in the [facility].      Cardiology Progress Note    Date of Service: 03/04/2019      Reason for visit:  Coronary artery disease, annual follow up.    Primary cardiologist: Dr. Amandeep Clark      HPI:  Mr. Corea is a pleasant 62-year-old active gentleman followed by Dr. Clark with a PMHx of known CAD. He has had prior stenting of a 95% lesion in the ramus intermedius in 2012 for exertional angina.  He also had a 70%-75% lesion in the LAD which had been followed by serial stress testing. Last year, in 2018 he had planned a cross country ski race and thus, a repeat stress test was performed. This showed a small anterolateral apical defect consistent with ischemia and mild global hypokinesis with an EF estimated at 43% at rest and stress. There was also a noted hypertensive response to exercise. Thus, in late Jan 2018 he underwent an angiogram; he was found to have a long eccentric narrowing in the LAD that was 75-80% stenosis. The FFR was 0.63. He then underwent successful PCI to the mid LAD. He was feeling well after, and completed his ski race without issues. He's back today for annual follow up.    Since last year he has been doing well. He remains active, and once again recently completed his ski race without issues. He denies any exertional CP, worsening HAIDER, dizziness, or palpitations, or leg swelling. He has never used his NTG, but does have a bottle in his truck (we discussed this today--I did send in a refill as his current pills are suspected to be a few years old). His BP remains under good control, and his HR is 49 which is not unusual for him. Today he brought with him outside labs done at his PCP in Aug 2018. His total cholesterol was 145, , HDL 48, and LDL 75. He remains on atorvastatin and tolerates  this well. His BMP was unremarkable. Hemoglobin A1C was near the top or normal at 5.6, and his TSH was also normal.       ASSESSMENT/PLAN:      1. Coronary artery disease.       --Hx of DANIELA placement to the ramus intermedius in Oct 2012 and more recently PCI to the mid LAD in Jan 2018. He remains free of angina and continues to be active. I refilled his NTG today.    --As he is now > 1 year out from his last stent, will discontinue Brilinta. He will continue ASA 81mg lifelong.    --Continue Toprol XL 12.5mg daily. His BP is well controlled. He has a low resting HR which is chronic and he is asymptomatic with this.      2. Dyslipidemia.   --He is checking this yearly with his PCP. Last checked Aug 2018.  His total cholesterol was 145, , HDL 48, and LDL 75. Continue atorvastatin 80mg daily.       Follow up plan:  With Dr. Amandeep Clark in 1 year, or sooner if needed.       Orders this Visit:  Orders Placed This Encounter   Procedures     Follow-Up with Cardiologist     Orders Placed This Encounter   Medications     nitroGLYcerin (NITROSTAT) 0.4 MG sublingual tablet     Sig: Place 1 tablet (0.4 mg) under the tongue every 5 minutes as needed for chest pain If symptoms persist call 911     Dispense:  20 tablet     Refill:  2     Medications Discontinued During This Encounter   Medication Reason     nitroGLYcerin (NITROSTAT) 0.4 MG sublingual tablet Reorder     ticagrelor (BRILINTA) 90 MG tablet Stop at Discharge           CURRENT MEDICATIONS:  Current Outpatient Medications   Medication Sig Dispense Refill     aspirin 81 MG EC tablet Take 1 tablet (81 mg) by mouth daily 1 tablet 0     atorvastatin (LIPITOR) 80 MG tablet Take 1 tablet (80 mg) by mouth daily 90 tablet 3     Fexofenadine HCl (ALLEGRA ALLERGY PO) Take by mouth as needed        metoprolol succinate (TOPROL-XL) 25 MG 24 hr tablet Take 0.5 tablets (12.5 mg) by mouth daily 45 tablet 3     nitroGLYcerin (NITROSTAT) 0.4 MG sublingual tablet Place 1 tablet (0.4  mg) under the tongue every 5 minutes as needed for chest pain If symptoms persist call 310 20 tablet 2       ALLERGIES     Allergies   Allergen Reactions     Seasonal Allergies        PAST MEDICAL HISTORY:  Past Medical History:   Diagnosis Date     Allergic rhinitis, cause unspecified      CAD (coronary artery disease) 10/2012    95% narrowing ramus intermedius     Contact dermatitis and other eczema, due to unspecified cause      Hyperlipidemia LDL goal <70     mixed     Impaired fasting glucose      Mixed hyperlipidemia      Sleep apnea        PAST SURGICAL HISTORY:  Past Surgical History:   Procedure Laterality Date     CL AFF SURGICAL PATHOLOGY      lipoma excision, left trunk     COLONOSCOPY N/A 2017    Procedure: COLONOSCOPY;  COLONOSCOPY;  Surgeon: Michael Kramer MD;  Location:  GI     ESOPHAGOSCOPY, GASTROSCOPY, DUODENOSCOPY (EGD), COMBINED  10/21/2011    Procedure:COMBINED ESOPHAGOSCOPY, GASTROSCOPY, DUODENOSCOPY (EGD), BIOPSY SINGLE OR MULTIPLE; Surgeon:MATTHEW SALMERON; Location: GI     HC COLONOSCOPY THRU STOMA, DIAGNOSTIC      NL, repeat in 10 years     HEART CATH, ANGIOPLASTY  10-01-12    PTCA and implantation of 3.0 x 18 mm length Medtronic Resolute zotarolimus-eluting stent in the mid segment of the ramus intermedius branch      HEART CATH, ANGIOPLASTY  2018    DANIELA to LAD     VASECTOMY         FAMILY HISTORY:  Family History   Problem Relation Age of Onset     Lipids Mother      Cancer Mother         lung, smoker     Lipids Father      Neurologic Disorder Brother         syncope     Heart Disease Maternal Grandfather          of MI     Connective Tissue Disorder Daughter         Sjogren's syndrome       SOCIAL HISTORY:  Social History     Socioeconomic History     Marital status:      Spouse name: None     Number of children: None     Years of education: None     Highest education level: None   Occupational History     None   Social Needs     Financial  resource strain: None     Food insecurity:     Worry: None     Inability: None     Transportation needs:     Medical: None     Non-medical: None   Tobacco Use     Smoking status: Never Smoker     Smokeless tobacco: Never Used   Substance and Sexual Activity     Alcohol use: Yes     Comment: 2 beer per day     Drug use: No     Sexual activity: Yes     Partners: Female   Lifestyle     Physical activity:     Days per week: None     Minutes per session: None     Stress: None   Relationships     Social connections:     Talks on phone: None     Gets together: None     Attends Restorationist service: None     Active member of club or organization: None     Attends meetings of clubs or organizations: None     Relationship status: None     Intimate partner violence:     Fear of current or ex partner: None     Emotionally abused: None     Physically abused: None     Forced sexual activity: None   Other Topics Concern     Parent/sibling w/ CABG, MI or angioplasty before 65F 55M? Not Asked      Service Not Asked     Blood Transfusions Not Asked     Caffeine Concern No     Comment: 2 pops a day     Occupational Exposure Not Asked     Hobby Hazards Not Asked     Sleep Concern No     Stress Concern No     Weight Concern No     Special Diet No     Back Care Not Asked     Exercise Yes     Comment: cross country skiing, biking      Bike Helmet Yes     Seat Belt Yes     Self-Exams Not Asked   Social History Narrative     None       Review of Systems:  Cardiovascular: negative for chest pain, palpitations, orthopnea, LE edema  Constitutional: negative for chills, sweats, fevers   Resp: Negative for dyspnea at rest, dyspnea on exertion, cough, wheezing, known chronic lung disease  HEENT: Negative for new visual changes, frequent headaches  Gastrointestinal: negative for abdominal pain, diarrhea, nausea, vomiting  Hematologic/lymphatic: negative for current systemic anticoagulation, hx of blood clots  Musculoskeletal: negative for new  "back pain, joint pain  Neurological: negative for focal weakness, LOC, seizures, syncope      Physical Exam:  Vitals: /72   Pulse (!) 49   Ht 1.753 m (5' 9\")   Wt 83.5 kg (184 lb)   BMI 27.17 kg/m      Wt Readings from Last 4 Encounters:   03/04/19 83.5 kg (184 lb)   02/21/18 80.9 kg (178 lb 4.8 oz)   01/31/18 85.2 kg (187 lb 12.8 oz)   01/31/18 85.2 kg (187 lb 12.8 oz)       GEN:  In general, this is a well nourished  male in no acute distress on room air.  Patient ambulatory.  HEENT:  Pupils equal, round. Sclerae nonicteric. Clear oropharynx. Mucous membranes moist.  NECK: Supple, no masses appreciated. Trachea midline. No JVD. No carotid bruits identified.  C/V:  Mildly bradycardic but regular rhythm. No murmur, rub or gallop.   RESP: Respirations are unlabored. No use of accessory muscles. Clear to auscultation bilaterally without wheezing, rales, or rhonchi.  GI: Abdomen soft, nontender, nondistended.   EXTREM: No LE edema. No cyanosis or clubbing.  NEURO: Alert and oriented, cooperative. Gait normal. No obvious focal deficits.   PSYCH: Normal affect.  SKIN: Warm and dry. No rashes or petechiae appreciated.       Recent Lab Results:  LIPID RESULTS:  As above in HPI.       Joy Calhoun PA-C  [facility]  Pager [phone]      Thank you for allowing me to participate in the care of your patient.    Sincerely,     TON Verma     [facility]    "

## 2019-03-04 NOTE — LETTER
3/4/2019    Amy Guzman, APRN CNP  [address]  [place] [address]    RE: Keith Longoriaishmael       Dear Colleague,    I had the pleasure of seeing Keith Corea in the [facility].      Cardiology Progress Note    Date of Service: 03/04/2019      Reason for visit:  Coronary artery disease, annual follow up.    Primary cardiologist: Dr. Amandeep Clark      HPI:  Mr. Corea is a pleasant 62-year-old active gentleman followed by Dr. Clark with a PMHx of known CAD. He has had prior stenting of a 95% lesion in the ramus intermedius in 2012 for exertional angina.  He also had a 70%-75% lesion in the LAD which had been followed by serial stress testing. Last year, in 2018 he had planned a cross country ski race and thus, a repeat stress test was performed. This showed a small anterolateral apical defect consistent with ischemia and mild global hypokinesis with an EF estimated at 43% at rest and stress. There was also a noted hypertensive response to exercise. Thus, in late Jan 2018 he underwent an angiogram; he was found to have a long eccentric narrowing in the LAD that was 75-80% stenosis. The FFR was 0.63. He then underwent successful PCI to the mid LAD. He was feeling well after, and completed his ski race without issues. He's back today for annual follow up.    Since last year he has been doing well. He remains active, and once again recently completed his ski race without issues. He denies any exertional CP, worsening HAIDER, dizziness, or palpitations, or leg swelling. He has never used his NTG, but does have a bottle in his truck (we discussed this today--I did send in a refill as his current pills are suspected to be a few years old). His BP remains under good control, and his HR is 49 which is not unusual for him. Today he brought with him outside labs done at his PCP in Aug 2018. His total cholesterol was 145, , HDL 48, and LDL 75. He remains on atorvastatin and tolerates  this well. His BMP was unremarkable. Hemoglobin A1C was near the top or normal at 5.6, and his TSH was also normal.       ASSESSMENT/PLAN:      1. Coronary artery disease.       --Hx of DANIELA placement to the ramus intermedius in Oct 2012 and more recently PCI to the mid LAD in Jan 2018. He remains free of angina and continues to be active. I refilled his NTG today.    --As he is now > 1 year out from his last stent, will discontinue Brilinta. He will continue ASA 81mg lifelong.    --Continue Toprol XL 12.5mg daily. His BP is well controlled. He has a low resting HR which is chronic and he is asymptomatic with this.      2. Dyslipidemia.   --He is checking this yearly with his PCP. Last checked Aug 2018.  His total cholesterol was 145, , HDL 48, and LDL 75. Continue atorvastatin 80mg daily.       Follow up plan:  With Dr. Amandeep Clark in 1 year, or sooner if needed.       Orders this Visit:  Orders Placed This Encounter   Procedures     Follow-Up with Cardiologist     Orders Placed This Encounter   Medications     nitroGLYcerin (NITROSTAT) 0.4 MG sublingual tablet     Sig: Place 1 tablet (0.4 mg) under the tongue every 5 minutes as needed for chest pain If symptoms persist call 911     Dispense:  20 tablet     Refill:  2     Medications Discontinued During This Encounter   Medication Reason     nitroGLYcerin (NITROSTAT) 0.4 MG sublingual tablet Reorder     ticagrelor (BRILINTA) 90 MG tablet Stop at Discharge           CURRENT MEDICATIONS:  Current Outpatient Medications   Medication Sig Dispense Refill     aspirin 81 MG EC tablet Take 1 tablet (81 mg) by mouth daily 1 tablet 0     atorvastatin (LIPITOR) 80 MG tablet Take 1 tablet (80 mg) by mouth daily 90 tablet 3     Fexofenadine HCl (ALLEGRA ALLERGY PO) Take by mouth as needed        metoprolol succinate (TOPROL-XL) 25 MG 24 hr tablet Take 0.5 tablets (12.5 mg) by mouth daily 45 tablet 3     nitroGLYcerin (NITROSTAT) 0.4 MG sublingual tablet Place 1 tablet (0.4  mg) under the tongue every 5 minutes as needed for chest pain If symptoms persist call 470 20 tablet 2       ALLERGIES     Allergies   Allergen Reactions     Seasonal Allergies        PAST MEDICAL HISTORY:  Past Medical History:   Diagnosis Date     Allergic rhinitis, cause unspecified      CAD (coronary artery disease) 10/2012    95% narrowing ramus intermedius     Contact dermatitis and other eczema, due to unspecified cause      Hyperlipidemia LDL goal <70     mixed     Impaired fasting glucose      Mixed hyperlipidemia      Sleep apnea        PAST SURGICAL HISTORY:  Past Surgical History:   Procedure Laterality Date     CL AFF SURGICAL PATHOLOGY      lipoma excision, left trunk     COLONOSCOPY N/A 2017    Procedure: COLONOSCOPY;  COLONOSCOPY;  Surgeon: Michael Kramer MD;  Location:  GI     ESOPHAGOSCOPY, GASTROSCOPY, DUODENOSCOPY (EGD), COMBINED  10/21/2011    Procedure:COMBINED ESOPHAGOSCOPY, GASTROSCOPY, DUODENOSCOPY (EGD), BIOPSY SINGLE OR MULTIPLE; Surgeon:MATTHEW SALMERON; Location: GI     HC COLONOSCOPY THRU STOMA, DIAGNOSTIC      NL, repeat in 10 years     HEART CATH, ANGIOPLASTY  10-01-12    PTCA and implantation of 3.0 x 18 mm length Medtronic Resolute zotarolimus-eluting stent in the mid segment of the ramus intermedius branch      HEART CATH, ANGIOPLASTY  2018    DANIELA to LAD     VASECTOMY         FAMILY HISTORY:  Family History   Problem Relation Age of Onset     Lipids Mother      Cancer Mother         lung, smoker     Lipids Father      Neurologic Disorder Brother         syncope     Heart Disease Maternal Grandfather          of MI     Connective Tissue Disorder Daughter         Sjogren's syndrome       SOCIAL HISTORY:  Social History     Socioeconomic History     Marital status:      Spouse name: None     Number of children: None     Years of education: None     Highest education level: None   Occupational History     None   Social Needs     Financial  resource strain: None     Food insecurity:     Worry: None     Inability: None     Transportation needs:     Medical: None     Non-medical: None   Tobacco Use     Smoking status: Never Smoker     Smokeless tobacco: Never Used   Substance and Sexual Activity     Alcohol use: Yes     Comment: 2 beer per day     Drug use: No     Sexual activity: Yes     Partners: Female   Lifestyle     Physical activity:     Days per week: None     Minutes per session: None     Stress: None   Relationships     Social connections:     Talks on phone: None     Gets together: None     Attends [employer] service: None     Active member of club or organization: None     Attends meetings of clubs or organizations: None     Relationship status: None     Intimate partner violence:     Fear of current or ex partner: None     Emotionally abused: None     Physically abused: None     Forced sexual activity: None   Other Topics Concern     Parent/sibling w/ CABG, MI or angioplasty before 65F 55M? Not Asked      Service Not Asked     Blood Transfusions Not Asked     Caffeine Concern No     Comment: 2 pops a day     Occupational Exposure Not Asked     Hobby Hazards Not Asked     Sleep Concern No     Stress Concern No     Weight Concern No     Special Diet No     Back Care Not Asked     Exercise Yes     Comment: cross country skiing, biking      Bike Helmet Yes     Seat Belt Yes     Self-Exams Not Asked   Social History Narrative     None       Review of Systems:  Cardiovascular: negative for chest pain, palpitations, orthopnea, LE edema  Constitutional: negative for chills, sweats, fevers   Resp: Negative for dyspnea at rest, dyspnea on exertion, cough, wheezing, known chronic lung disease  HEENT: Negative for new visual changes, frequent headaches  Gastrointestinal: negative for abdominal pain, diarrhea, nausea, vomiting  Hematologic/lymphatic: negative for current systemic anticoagulation, hx of blood clots  Musculoskeletal: negative for new  "back pain, joint pain  Neurological: negative for focal weakness, LOC, seizures, syncope      Physical Exam:  Vitals: /72   Pulse (!) 49   Ht 1.753 m (5' 9\")   Wt 83.5 kg (184 lb)   BMI 27.17 kg/m      Wt Readings from Last 4 Encounters:   03/04/19 83.5 kg (184 lb)   02/21/18 80.9 kg (178 lb 4.8 oz)   01/31/18 85.2 kg (187 lb 12.8 oz)   01/31/18 85.2 kg (187 lb 12.8 oz)       GEN:  In general, this is a well nourished  male in no acute distress on room air.  Patient ambulatory.  HEENT:  Pupils equal, round. Sclerae nonicteric. Clear oropharynx. Mucous membranes moist.  NECK: Supple, no masses appreciated. Trachea midline. No JVD. No carotid bruits identified.  C/V:  Mildly bradycardic but regular rhythm. No murmur, rub or gallop.   RESP: Respirations are unlabored. No use of accessory muscles. Clear to auscultation bilaterally without wheezing, rales, or rhonchi.  GI: Abdomen soft, nontender, nondistended.   EXTREM: No LE edema. No cyanosis or clubbing.  NEURO: Alert and oriented, cooperative. Gait normal. No obvious focal deficits.   PSYCH: Normal affect.  SKIN: Warm and dry. No rashes or petechiae appreciated.       Recent Lab Results:  LIPID RESULTS:  As above in HPI.       ANTHONY VermaC  [facility]  Pager [phone]      Thank you for allowing me to participate in the care of your patient.      Sincerely,     TON Verma     [facility]    cc:   NAYA Chiu CNP  [address] [place] [address]        "

## 2019-03-04 NOTE — PROGRESS NOTES
Cardiology Progress Note    Date of Service: 03/04/2019      Reason for visit:  Coronary artery disease, annual follow up.    Primary cardiologist: Dr. Amandeep Clark      HPI:  Mr. Corea is a pleasant 62-year-old active gentleman followed by Dr. Clark with a PMHx of known CAD. He has had prior stenting of a 95% lesion in the ramus intermedius in 2012 for exertional angina.  He also had a 70%-75% lesion in the LAD which had been followed by serial stress testing. Last year, in 2018 he had planned a cross country ski race and thus, a repeat stress test was performed. This showed a small anterolateral apical defect consistent with ischemia and mild global hypokinesis with an EF estimated at 43% at rest and stress. There was also a noted hypertensive response to exercise. Thus, in late Jan 2018 he underwent an angiogram; he was found to have a long eccentric narrowing in the LAD that was 75-80% stenosis. The FFR was 0.63. He then underwent successful PCI to the mid LAD. He was feeling well after, and completed his ski race without issues. He's back today for annual follow up.    Since last year he has been doing well. He remains active, and once again recently completed his ski race without issues. He denies any exertional CP, worsening HAIDER, dizziness, or palpitations, or leg swelling. He has never used his NTG, but does have a bottle in his truck (we discussed this today--I did send in a refill as his current pills are suspected to be a few years old). His BP remains under good control, and his HR is 49 which is not unusual for him. Today he brought with him outside labs done at his PCP in Aug 2018. His total cholesterol was 145, , HDL 48, and LDL 75. He remains on atorvastatin and tolerates this well. His BMP was unremarkable. Hemoglobin A1C was near the top or normal at 5.6, and his TSH was also normal.       ASSESSMENT/PLAN:      1. Coronary artery disease.       --Hx of DANIELA placement to the ramus intermedius in  Oct 2012 and more recently PCI to the mid LAD in Jan 2018. He remains free of angina and continues to be active. I refilled his NTG today.    --As he is now > 1 year out from his last stent, will discontinue Brilinta. He will continue ASA 81mg lifelong.    --Continue Toprol XL 12.5mg daily. His BP is well controlled. He has a low resting HR which is chronic and he is asymptomatic with this.      2. Dyslipidemia.   --He is checking this yearly with his PCP. Last checked Aug 2018.  His total cholesterol was 145, , HDL 48, and LDL 75. Continue atorvastatin 80mg daily.       Follow up plan:  With Dr. Amandeep Clark in 1 year, or sooner if needed.       Orders this Visit:  Orders Placed This Encounter   Procedures     Follow-Up with Cardiologist     Orders Placed This Encounter   Medications     nitroGLYcerin (NITROSTAT) 0.4 MG sublingual tablet     Sig: Place 1 tablet (0.4 mg) under the tongue every 5 minutes as needed for chest pain If symptoms persist call 911     Dispense:  20 tablet     Refill:  2     Medications Discontinued During This Encounter   Medication Reason     nitroGLYcerin (NITROSTAT) 0.4 MG sublingual tablet Reorder     ticagrelor (BRILINTA) 90 MG tablet Stop at Discharge           CURRENT MEDICATIONS:  Current Outpatient Medications   Medication Sig Dispense Refill     aspirin 81 MG EC tablet Take 1 tablet (81 mg) by mouth daily 1 tablet 0     atorvastatin (LIPITOR) 80 MG tablet Take 1 tablet (80 mg) by mouth daily 90 tablet 3     Fexofenadine HCl (ALLEGRA ALLERGY PO) Take by mouth as needed        metoprolol succinate (TOPROL-XL) 25 MG 24 hr tablet Take 0.5 tablets (12.5 mg) by mouth daily 45 tablet 3     nitroGLYcerin (NITROSTAT) 0.4 MG sublingual tablet Place 1 tablet (0.4 mg) under the tongue every 5 minutes as needed for chest pain If symptoms persist call 911 20 tablet 2       ALLERGIES     Allergies   Allergen Reactions     Seasonal Allergies        PAST MEDICAL HISTORY:  Past Medical History:    Diagnosis Date     Allergic rhinitis, cause unspecified      CAD (coronary artery disease) 10/2012    95% narrowing ramus intermedius     Contact dermatitis and other eczema, due to unspecified cause      Hyperlipidemia LDL goal <70     mixed     Impaired fasting glucose      Mixed hyperlipidemia      Sleep apnea        PAST SURGICAL HISTORY:  Past Surgical History:   Procedure Laterality Date     CL AFF SURGICAL PATHOLOGY      lipoma excision, left trunk     COLONOSCOPY N/A 2017    Procedure: COLONOSCOPY;  COLONOSCOPY;  Surgeon: Michael Kramer MD;  Location:  GI     ESOPHAGOSCOPY, GASTROSCOPY, DUODENOSCOPY (EGD), COMBINED  10/21/2011    Procedure:COMBINED ESOPHAGOSCOPY, GASTROSCOPY, DUODENOSCOPY (EGD), BIOPSY SINGLE OR MULTIPLE; Surgeon:MATTHEW SALMERON; Location: GI     HC COLONOSCOPY THRU STOMA, DIAGNOSTIC      NL, repeat in 10 years     HEART CATH, ANGIOPLASTY  10-01-12    PTCA and implantation of 3.0 x 18 mm length Medtronic Resolute zotarolimus-eluting stent in the mid segment of the ramus intermedius branch      HEART CATH, ANGIOPLASTY  2018    DANIELA to LAD     VASECTOMY         FAMILY HISTORY:  Family History   Problem Relation Age of Onset     Lipids Mother      Cancer Mother         lung, smoker     Lipids Father      Neurologic Disorder Brother         syncope     Heart Disease Maternal Grandfather          of MI     Connective Tissue Disorder Daughter         Sjogren's syndrome       SOCIAL HISTORY:  Social History     Socioeconomic History     Marital status:      Spouse name: None     Number of children: None     Years of education: None     Highest education level: None   Occupational History     None   Social Needs     Financial resource strain: None     Food insecurity:     Worry: None     Inability: None     Transportation needs:     Medical: None     Non-medical: None   Tobacco Use     Smoking status: Never Smoker     Smokeless tobacco: Never Used  "  Substance and Sexual Activity     Alcohol use: Yes     Comment: 2 beer per day     Drug use: No     Sexual activity: Yes     Partners: Female   Lifestyle     Physical activity:     Days per week: None     Minutes per session: None     Stress: None   Relationships     Social connections:     Talks on phone: None     Gets together: None     Attends Judaism service: None     Active member of club or organization: None     Attends meetings of clubs or organizations: None     Relationship status: None     Intimate partner violence:     Fear of current or ex partner: None     Emotionally abused: None     Physically abused: None     Forced sexual activity: None   Other Topics Concern     Parent/sibling w/ CABG, MI or angioplasty before 65F 55M? Not Asked      Service Not Asked     Blood Transfusions Not Asked     Caffeine Concern No     Comment: 2 pops a day     Occupational Exposure Not Asked     Hobby Hazards Not Asked     Sleep Concern No     Stress Concern No     Weight Concern No     Special Diet No     Back Care Not Asked     Exercise Yes     Comment: cross country skiing, biking      Bike Helmet Yes     Seat Belt Yes     Self-Exams Not Asked   Social History Narrative     None       Review of Systems:  Cardiovascular: negative for chest pain, palpitations, orthopnea, LE edema  Constitutional: negative for chills, sweats, fevers   Resp: Negative for dyspnea at rest, dyspnea on exertion, cough, wheezing, known chronic lung disease  HEENT: Negative for new visual changes, frequent headaches  Gastrointestinal: negative for abdominal pain, diarrhea, nausea, vomiting  Hematologic/lymphatic: negative for current systemic anticoagulation, hx of blood clots  Musculoskeletal: negative for new back pain, joint pain  Neurological: negative for focal weakness, LOC, seizures, syncope      Physical Exam:  Vitals: /72   Pulse (!) 49   Ht 1.753 m (5' 9\")   Wt 83.5 kg (184 lb)   BMI 27.17 kg/m     Wt Readings " from Last 4 Encounters:   03/04/19 83.5 kg (184 lb)   02/21/18 80.9 kg (178 lb 4.8 oz)   01/31/18 85.2 kg (187 lb 12.8 oz)   01/31/18 85.2 kg (187 lb 12.8 oz)       GEN:  In general, this is a well nourished  male in no acute distress on room air.  Patient ambulatory.  HEENT:  Pupils equal, round. Sclerae nonicteric. Clear oropharynx. Mucous membranes moist.  NECK: Supple, no masses appreciated. Trachea midline. No JVD. No carotid bruits identified.  C/V:  Mildly bradycardic but regular rhythm. No murmur, rub or gallop.   RESP: Respirations are unlabored. No use of accessory muscles. Clear to auscultation bilaterally without wheezing, rales, or rhonchi.  GI: Abdomen soft, nontender, nondistended.   EXTREM: No LE edema. No cyanosis or clubbing.  NEURO: Alert and oriented, cooperative. Gait normal. No obvious focal deficits.   PSYCH: Normal affect.  SKIN: Warm and dry. No rashes or petechiae appreciated.       Recent Lab Results:  LIPID RESULTS:  As above in HPI.       Joy Calhoun PA-C  [facility]  Pager [phone]

## 2019-03-04 NOTE — PATIENT INSTRUCTIONS
Visit Summary:    Today we discussed:   You may stop the Brilinta.  Please call if any new concerning symptoms.     Test results:   None today     Follow up:   With Dr Clark in 1 year.     Please call my nurse Rosemarie at [phone] with any questions or concerns.

## 2019-04-29 DIAGNOSIS — R07.89 CHEST PAIN, MIDSTERNAL: ICD-10-CM

## 2019-04-29 RX ORDER — METOPROLOL SUCCINATE 25 MG/1
12.5 TABLET, EXTENDED RELEASE ORAL DAILY
Qty: 45 TABLET | Refills: 3 | Status: SHIPPED | OUTPATIENT
Start: 2019-04-29 | End: 2020-06-08

## 2019-06-04 DIAGNOSIS — I25.10 CAD S/P PERCUTANEOUS CORONARY ANGIOPLASTY: ICD-10-CM

## 2019-06-04 DIAGNOSIS — Z98.61 CAD S/P PERCUTANEOUS CORONARY ANGIOPLASTY: ICD-10-CM

## 2019-06-04 RX ORDER — ATORVASTATIN CALCIUM 80 MG/1
80 TABLET, FILM COATED ORAL DAILY
Qty: 90 TABLET | Refills: 3 | Status: SHIPPED | OUTPATIENT
Start: 2019-06-04 | End: 2020-06-08

## 2019-06-04 RX ORDER — ATORVASTATIN CALCIUM 80 MG/1
80 TABLET, FILM COATED ORAL DAILY
Qty: 90 TABLET | Refills: 3 | Status: SHIPPED | OUTPATIENT
Start: 2019-06-04 | End: 2019-06-04

## 2020-04-16 ENCOUNTER — VIRTUAL VISIT (OUTPATIENT)
Dept: CARDIOLOGY | Facility: CLINIC | Age: 65
End: 2020-04-16
Payer: COMMERCIAL

## 2020-04-16 DIAGNOSIS — I25.10 CAD S/P PERCUTANEOUS CORONARY ANGIOPLASTY: Primary | ICD-10-CM

## 2020-04-16 DIAGNOSIS — Z98.61 CAD S/P PERCUTANEOUS CORONARY ANGIOPLASTY: Primary | ICD-10-CM

## 2020-04-16 PROCEDURE — 99213 OFFICE O/P EST LOW 20 MIN: CPT | Mod: 95 | Performed by: INTERNAL MEDICINE

## 2020-04-16 NOTE — PROGRESS NOTES
"Keith Corea is a 64 year old male who is being evaluated via a billable video visit.      The patient has been notified of following:     \"This video visit will be conducted via a call between you and your physician/provider. We have found that certain health care needs can be provided without the need for an in-person physical exam.  This service lets us provide the care you need with a video conversation.  If a prescription is necessary we can send it directly to your pharmacy.  If lab work is needed we can place an order for that and you can then stop by our lab to have the test done at a later time.    Video visits are billed at different rates depending on your insurance coverage.  Please reach out to your insurance provider with any questions.    If during the course of the call the physician/provider feels a video visit is not appropriate, you will not be charged for this service.\"    Patient has given verbal consent for Video visit? Yes    How would you like to obtain your AVS? Mail a copy    Patient would like the video invitation sent by: Text to cell phone: [phone]      VITALS REPORTED BY PATIENT:  Weight- 182 lbs  BP- 122/74 ( at last physical at [facility])    PALMIRA Randhawa    Video Start Time: 8:00 AM     See dictated note.    General:  no apparent distress, normal body habitus, sitting upright.  ENT/Mouth:  membranes moist, no nasal discharge.  Normal head shape, no apparent injury or laceration.  Eyes:  no scleral icterus, normal conjunctivae.  No observed jaundice.  Neck:  no apparent neck swelling.   Chest/Lungs:  No breathing difficulty while speaking.  No audible wheezing.  No cough during conversation.  Cardiovascular:  No obviously elevated jugular venous pressure.  No apparent edema bilaterally in LE.   Abdomen:  no obvious abdominal distention.   Extremities:  no cyanosis seen.  Skin:  no xanthelasma.  No facial lacerations.  Neurologic:  Normal arm motion bilateral, no tremors. "    Psychiatric:  Alert and oriented x3, calm demeanor      Video-Visit Details    Type of service:  Video Visit    Video End Time (time video stopped): 8:20    Originating Location (pt. Location): Home    Distant Location (provider location):  [facility]     Mode of Communication:  Video Conference via [employer]      Amandeep Clark MD

## 2020-04-16 NOTE — PROGRESS NOTES
Service Date: 04/16/2020      CARDIOLOGY FOLLOWUP VIDEO VISIT      HISTORY OF PRESENT ILLNESS:  I had the pleasure of seeing Mr. Corea in followup at the [facility] today by video visit.  He is a very pleasant 64-year-old gentleman who I last saw in 01/2018.  He has a history of coronary artery disease and is status post stenting of a 95% lesion in the ramus intermedius in 09/2012 for exertional angina.  At his last office visit, he was planning to race the [employer], and given known moderate LAD disease, I repeated a stress perfusion study at the time.  This demonstrated evidence of anterior ischemia, and given his known anatomy, I referred him for coronary angiography.        Coronary angiography was performed on 01/31/2018 and demonstrated a 75%-80% lesion in the mid LAD with a hemodynamically significant FFR.  He underwent successful drug-eluting stent placement to this vessel.  He subsequently raced the [employer] without any difficulty.      Today he continues to feel well.  He specifically denies any chest pain, dyspnea on exertion, PND, orthopnea or lower extremity edema.  Denies any syncope or presyncope.  He has been walking and biking regularly without any limitations.  He denies any syncope or presyncope.      He has been taking all his medications as prescribed.      PHYSICAL EXAMINATION:  Dictated below.      LABORATORY:  Labs on 08/27/2019 demonstrated total cholesterol 142, triglycerides of 88, HDL of 44, LDL of 80.      IMPRESSION:   1.  Coronary artery disease, as described above.   2.  Dyslipidemia.      Mr. Corea is doing well overall from a cardiovascular standpoint.  There is no evidence of angina or congestive heart failure.  I am concerned about the slight increase in his LDL since his last office visit, and we will schedule a repeat assessment in this regard in approximately 6 months.  If at that time, the LDL is still above 80 mg/dL, the addition of Zetia  or transition to rosuvastatin may be necessary.      I will also obtain an echocardiogram at the time of his next office visit for reassessment of left ventricular systolic function.      It was a pleasure seeing him in followup today.         KEON SINGER MD             D: 2020   T: 2020   MT: yohan      Name:     OMID ZIMMERMAN   MRN:      [id number]        Account:      [id number]   :      1955           Service Date: 2020      Document: [id number]

## 2020-06-08 DIAGNOSIS — Z98.61 CAD S/P PERCUTANEOUS CORONARY ANGIOPLASTY: ICD-10-CM

## 2020-06-08 DIAGNOSIS — I25.10 CAD S/P PERCUTANEOUS CORONARY ANGIOPLASTY: ICD-10-CM

## 2020-06-08 DIAGNOSIS — R07.89 CHEST PAIN, MIDSTERNAL: ICD-10-CM

## 2020-06-08 RX ORDER — METOPROLOL SUCCINATE 25 MG/1
12.5 TABLET, EXTENDED RELEASE ORAL DAILY
Qty: 45 TABLET | Refills: 3 | Status: SHIPPED | OUTPATIENT
Start: 2020-06-08 | End: 2021-04-08

## 2020-06-08 RX ORDER — ATORVASTATIN CALCIUM 80 MG/1
80 TABLET, FILM COATED ORAL DAILY
Qty: 90 TABLET | Refills: 1 | Status: SHIPPED | OUTPATIENT
Start: 2020-06-08 | End: 2021-01-06

## 2020-10-05 ENCOUNTER — DOCUMENTATION ONLY (OUTPATIENT)
Dept: CARDIOLOGY | Facility: CLINIC | Age: 65
End: 2020-10-05

## 2020-10-05 ENCOUNTER — HOSPITAL ENCOUNTER (OUTPATIENT)
Dept: CARDIOLOGY | Facility: CLINIC | Age: 65
Discharge: HOME | End: 2020-10-05
Attending: INTERNAL MEDICINE | Admitting: INTERNAL MEDICINE
Payer: COMMERCIAL

## 2020-10-05 DIAGNOSIS — Z98.61 CAD S/P PERCUTANEOUS CORONARY ANGIOPLASTY: Primary | ICD-10-CM

## 2020-10-05 DIAGNOSIS — Z98.61 CAD S/P PERCUTANEOUS CORONARY ANGIOPLASTY: ICD-10-CM

## 2020-10-05 DIAGNOSIS — I25.10 CAD S/P PERCUTANEOUS CORONARY ANGIOPLASTY: ICD-10-CM

## 2020-10-05 DIAGNOSIS — I25.10 CAD S/P PERCUTANEOUS CORONARY ANGIOPLASTY: Primary | ICD-10-CM

## 2020-10-05 DIAGNOSIS — I25.10 CORONARY ARTERY DISEASE INVOLVING NATIVE CORONARY ARTERY OF NATIVE HEART WITHOUT ANGINA PECTORIS: ICD-10-CM

## 2020-10-05 DIAGNOSIS — E78.00 PURE HYPERCHOLESTEROLEMIA: ICD-10-CM

## 2020-10-05 LAB
CHOLEST SERPL-MCNC: 144 MG/DL
HDLC SERPL-MCNC: 50 MG/DL
LDLC SERPL CALC-MCNC: 77 MG/DL
NONHDLC SERPL-MCNC: 94 MG/DL
TRIGL SERPL-MCNC: 86 MG/DL

## 2020-10-05 PROCEDURE — 80061 LIPID PANEL: CPT | Performed by: INTERNAL MEDICINE

## 2020-10-05 PROCEDURE — 93306 TTE W/DOPPLER COMPLETE: CPT | Mod: 26 | Performed by: INTERNAL MEDICINE

## 2020-10-05 PROCEDURE — 93306 TTE W/DOPPLER COMPLETE: CPT

## 2020-10-05 PROCEDURE — 36415 COLL VENOUS BLD VENIPUNCTURE: CPT | Performed by: INTERNAL MEDICINE

## 2020-10-05 NOTE — PROGRESS NOTES
Dr. Clark's reply -   Yes that's fine thanks.     Spoke with Patient who agrees with Annual visit in April 2021. Orders and labs entered.

## 2020-10-05 NOTE — PROGRESS NOTES
Echo and lipid panel 10/5/2020 noted. Patient is overdue to OV.    Echo: The left ventricle is normal in size.  There is mild concentric left ventricular hypertrophy. Left ventricular systolic function is normal. The visual ejection fraction is estimated at 60-65%. No regional wall motion abnormalities noted.  Compared to prior study, there is no significant change.    Lipids:  Component      Latest Ref Rng & Units 10/5/2020   Cholesterol      <200 mg/dL 144   Triglycerides      <150 mg/dL 86   HDL Cholesterol      >39 mg/dL 50   LDL Cholesterol Calculated      <100 mg/dL 77   Non HDL Cholesterol      <130 mg/dL 94     Spoke with patient, he states he was seen in April and is feeling well. He does not have return orders yet.    Will message Dr. Clark to review

## 2020-12-17 ENCOUNTER — TRANSFERRED RECORDS (OUTPATIENT)
Dept: HEALTH INFORMATION MANAGEMENT | Facility: CLINIC | Age: 65
End: 2020-12-17

## 2021-01-06 DIAGNOSIS — Z98.61 CAD S/P PERCUTANEOUS CORONARY ANGIOPLASTY: ICD-10-CM

## 2021-01-06 DIAGNOSIS — I25.10 CAD S/P PERCUTANEOUS CORONARY ANGIOPLASTY: ICD-10-CM

## 2021-01-06 RX ORDER — ATORVASTATIN CALCIUM 80 MG/1
80 TABLET, FILM COATED ORAL DAILY
Qty: 90 TABLET | Refills: 1 | Status: SHIPPED | OUTPATIENT
Start: 2021-01-06 | End: 2021-04-08

## 2021-04-07 DIAGNOSIS — I25.10 CORONARY ARTERY DISEASE INVOLVING NATIVE CORONARY ARTERY OF NATIVE HEART WITHOUT ANGINA PECTORIS: ICD-10-CM

## 2021-04-07 DIAGNOSIS — E78.00 PURE HYPERCHOLESTEROLEMIA: ICD-10-CM

## 2021-04-07 LAB
ALT SERPL W P-5'-P-CCNC: 43 U/L (ref 0–70)
ANION GAP SERPL CALCULATED.3IONS-SCNC: 2 MMOL/L (ref 3–14)
BUN SERPL-MCNC: 21 MG/DL (ref 7–30)
CALCIUM SERPL-MCNC: 8.9 MG/DL (ref 8.5–10.1)
CHLORIDE SERPL-SCNC: 109 MMOL/L (ref 94–109)
CHOLEST SERPL-MCNC: 125 MG/DL
CO2 SERPL-SCNC: 31 MMOL/L (ref 20–32)
CREAT SERPL-MCNC: 0.99 MG/DL (ref 0.66–1.25)
GFR SERPL CREATININE-BSD FRML MDRD: 80 ML/MIN/{1.73_M2}
GLUCOSE SERPL-MCNC: 114 MG/DL (ref 70–99)
HDLC SERPL-MCNC: 52 MG/DL
LDLC SERPL CALC-MCNC: 61 MG/DL
NONHDLC SERPL-MCNC: 73 MG/DL
POTASSIUM SERPL-SCNC: 4.2 MMOL/L (ref 3.4–5.3)
SODIUM SERPL-SCNC: 142 MMOL/L (ref 133–144)
TRIGL SERPL-MCNC: 59 MG/DL

## 2021-04-07 PROCEDURE — 36415 COLL VENOUS BLD VENIPUNCTURE: CPT | Performed by: INTERNAL MEDICINE

## 2021-04-07 PROCEDURE — 80061 LIPID PANEL: CPT | Performed by: INTERNAL MEDICINE

## 2021-04-07 PROCEDURE — 84460 ALANINE AMINO (ALT) (SGPT): CPT | Performed by: INTERNAL MEDICINE

## 2021-04-07 PROCEDURE — 80048 BASIC METABOLIC PNL TOTAL CA: CPT | Performed by: INTERNAL MEDICINE

## 2021-04-07 NOTE — PROGRESS NOTES
HPI and Plan:     I had the pleasure of seeing Mr. Corea in followup at the [facility] today by video visit.  He is a very pleasant 65-year-old gentleman who I last saw in 04/2020. He has a history of coronary artery disease and is status post stenting of a 95% lesion in the ramus intermedius in 09/2012 for exertional angina.  At his last office visit, he was planning to race the [employer], and given known moderate LAD disease, I repeated a stress perfusion study at the time.  This demonstrated evidence of anterior ischemia, and given his known anatomy, I referred him for coronary angiography.        Coronary angiography was performed on 01/31/2018 and demonstrated a 75%-80% lesion in the mid LAD with a hemodynamically significant FFR.  He underwent successful drug-eluting stent placement to this vessel.  He subsequently raced the [employer] without any difficulty.      Today he continues to feel well.  He specifically denies any chest pain, dyspnea on exertion, PND, orthopnea or lower extremity edema.  Denies any syncope or presyncope.  He had been walking and biking regularly without any limitations, but did have a nasty fall last summer.  Subsequently he is recovered and raced the [employer] virtually  without any difficulty.  He denies any syncope or presyncope.      He has been taking all his medications as prescribed.  He has received a second COVID-19 vaccination and has tolerated it well.     PHYSICAL EXAMINATION:  Dictated below.     I reviewed an echocardiogram from 10/5/2020.  This demonstrated normal left ventricular size and systolic function with no significant valvular abnormalities.     LABORATORY:  Labs on 04/07/2021 demonstrated total cholesterol 125, triglycerides of 59, HDL of 52, LDL of 61.      IMPRESSION:   1.  Coronary artery disease, as described above.  Remains on aspirin 81 mg daily, atorvastatin 80 mg daily, and Toprol-XL 12.5 mg daily.  2.   Dyslipidemia.  Lipids at goal on atorvastatin 80 mg daily.    The patient is doing well overall from a cardiovascular standpoint without any evidence of angina or congestive heart failure.  I will plan on seeing him in follow-up in approximately 1 year or sooner if his clinical condition dictates otherwise.    It was a pleasure seeing him today.                CURRENT MEDICATIONS:  Current Outpatient Medications   Medication Sig Dispense Refill     aspirin 81 MG EC tablet Take 1 tablet (81 mg) by mouth daily 1 tablet 0     atorvastatin (LIPITOR) 80 MG tablet Take 1 tablet (80 mg) by mouth daily 90 tablet 1     Fexofenadine HCl (ALLEGRA ALLERGY PO) Take by mouth as needed        metoprolol succinate ER (TOPROL-XL) 25 MG 24 hr tablet Take 0.5 tablets (12.5 mg) by mouth daily 45 tablet 3     nitroGLYcerin (NITROSTAT) 0.4 MG sublingual tablet Place 1 tablet (0.4 mg) under the tongue every 5 minutes as needed for chest pain If symptoms persist call 911 20 tablet 2       ALLERGIES     Allergies   Allergen Reactions     Seasonal Allergies        PAST MEDICAL HISTORY:  Past Medical History:   Diagnosis Date     Allergic rhinitis, cause unspecified      CAD (coronary artery disease) 10/2012    95% narrowing ramus intermedius     Contact dermatitis and other eczema, due to unspecified cause      Hyperlipidemia LDL goal <70     mixed     Impaired fasting glucose      Mixed hyperlipidemia      Sleep apnea        PAST SURGICAL HISTORY:  Past Surgical History:   Procedure Laterality Date     CL AFF SURGICAL PATHOLOGY      lipoma excision, left trunk     COLONOSCOPY N/A 12/12/2017    Procedure: COLONOSCOPY;  COLONOSCOPY;  Surgeon: Michael Kramer MD;  Location:  GI     ESOPHAGOSCOPY, GASTROSCOPY, DUODENOSCOPY (EGD), COMBINED  10/21/2011    Procedure:COMBINED ESOPHAGOSCOPY, GASTROSCOPY, DUODENOSCOPY (EGD), BIOPSY SINGLE OR MULTIPLE; Surgeon:MATTHEW SALMERON; Location: GI     HC COLONOSCOPY THRU STOMA, DIAGNOSTIC  2007     NL, repeat in 10 years     HEART CATH, ANGIOPLASTY  10-01-12    PTCA and implantation of 3.0 x 18 mm length Medtronic Resolute zotarolimus-eluting stent in the mid segment of the ramus intermedius branch      HEART CATH, ANGIOPLASTY  2018    DANIELA to LAD     VASECTOMY         FAMILY HISTORY:  Family History   Problem Relation Age of Onset     Lipids Mother      Cancer Mother         lung, smoker     Lipids Father      Neurologic Disorder Brother         syncope     Heart Disease Maternal Grandfather          of MI     Connective Tissue Disorder Daughter         Sjogren's syndrome       SOCIAL HISTORY:  Social History     Socioeconomic History     Marital status:      Spouse name: Not on file     Number of children: Not on file     Years of education: Not on file     Highest education level: Not on file   Occupational History     Not on file   Social Needs     Financial resource strain: Not on file     Food insecurity     Worry: Not on file     Inability: Not on file     Transportation needs     Medical: Not on file     Non-medical: Not on file   Tobacco Use     Smoking status: Never Smoker     Smokeless tobacco: Never Used   Substance and Sexual Activity     Alcohol use: Yes     Comment: 2 beer per day     Drug use: No     Sexual activity: Yes     Partners: Female   Lifestyle     Physical activity     Days per week: Not on file     Minutes per session: Not on file     Stress: Not on file   Relationships     Social connections     Talks on phone: Not on file     Gets together: Not on file     Attends [employer] service: Not on file     Active member of club or organization: Not on file     Attends meetings of clubs or organizations: Not on file     Relationship status: Not on file     Intimate partner violence     Fear of current or ex partner: Not on file     Emotionally abused: Not on file     Physically abused: Not on file     Forced sexual activity: Not on file   Other Topics Concern      Parent/sibling w/ CABG, MI or angioplasty before 65F 55M? No      Service Not Asked     Blood Transfusions Not Asked     Caffeine Concern No     Comment: 2 pops a day     Occupational Exposure Not Asked     Hobby Hazards Not Asked     Sleep Concern No     Stress Concern No     Weight Concern No     Special Diet No     Back Care Not Asked     Exercise Yes     Comment: cross country skiing, biking      Bike Helmet Yes     Seat Belt Yes     Self-Exams Not Asked   Social History Narrative     Not on file       Review of Systems:  Skin:          Eyes:         ENT:         Respiratory:          Cardiovascular:         Gastroenterology:        Genitourinary:         Musculoskeletal:         Neurologic:         Psychiatric:         Heme/Lymph/Imm:         Endocrine:           Physical Exam:  Vitals: There were no vitals taken for this visit.    Constitutional:           Skin:             Head:           Eyes:           Lymph:      ENT:           Neck:           Respiratory:            Cardiac:                                                           GI:           Extremities and Muscular Skeletal:                 Neurological:           Psych:           CC  No referring provider defined for this encounter.

## 2021-04-08 ENCOUNTER — OFFICE VISIT (OUTPATIENT)
Dept: CARDIOLOGY | Facility: CLINIC | Age: 66
End: 2021-04-08
Payer: COMMERCIAL

## 2021-04-08 VITALS
HEART RATE: 56 BPM | BODY MASS INDEX: 28.29 KG/M2 | SYSTOLIC BLOOD PRESSURE: 130 MMHG | DIASTOLIC BLOOD PRESSURE: 76 MMHG | WEIGHT: 191 LBS | HEIGHT: 69 IN

## 2021-04-08 DIAGNOSIS — I25.10 CAD S/P PERCUTANEOUS CORONARY ANGIOPLASTY: ICD-10-CM

## 2021-04-08 DIAGNOSIS — E78.00 PURE HYPERCHOLESTEROLEMIA: ICD-10-CM

## 2021-04-08 DIAGNOSIS — Z98.61 CAD S/P PERCUTANEOUS CORONARY ANGIOPLASTY: ICD-10-CM

## 2021-04-08 DIAGNOSIS — I25.10 CORONARY ARTERY DISEASE INVOLVING NATIVE CORONARY ARTERY OF NATIVE HEART WITHOUT ANGINA PECTORIS: ICD-10-CM

## 2021-04-08 DIAGNOSIS — R07.89 CHEST PAIN, MIDSTERNAL: ICD-10-CM

## 2021-04-08 PROCEDURE — 99214 OFFICE O/P EST MOD 30 MIN: CPT | Performed by: INTERNAL MEDICINE

## 2021-04-08 RX ORDER — METOPROLOL SUCCINATE 25 MG/1
12.5 TABLET, EXTENDED RELEASE ORAL DAILY
Qty: 45 TABLET | Refills: 3 | Status: SHIPPED | OUTPATIENT
Start: 2021-04-08 | End: 2022-04-20

## 2021-04-08 RX ORDER — NITROGLYCERIN 0.4 MG/1
0.4 TABLET SUBLINGUAL EVERY 5 MIN PRN
Qty: 20 TABLET | Refills: 2 | Status: SHIPPED | OUTPATIENT
Start: 2021-04-08

## 2021-04-08 RX ORDER — ATORVASTATIN CALCIUM 80 MG/1
80 TABLET, FILM COATED ORAL DAILY
Qty: 90 TABLET | Refills: 3 | Status: SHIPPED | OUTPATIENT
Start: 2021-04-08 | End: 2022-04-20

## 2021-04-08 ASSESSMENT — MIFFLIN-ST. JEOR: SCORE: 1651.75

## 2021-04-08 NOTE — LETTER
4/8/2021    NAYA Chiu CNP  No address on file    RE: Keith Corea       Dear Colleague,    I had the pleasure of seeing Keith Corea in the [facility].    HPI and Plan:     I had the pleasure of seeing Mr. Corea in followup at the [facility] today by video visit.  He is a very pleasant 65-year-old gentleman who I last saw in 04/2020. He has a history of coronary artery disease and is status post stenting of a 95% lesion in the ramus intermedius in 09/2012 for exertional angina.  At his last office visit, he was planning to race the [employer], and given known moderate LAD disease, I repeated a stress perfusion study at the time.  This demonstrated evidence of anterior ischemia, and given his known anatomy, I referred him for coronary angiography.        Coronary angiography was performed on 01/31/2018 and demonstrated a 75%-80% lesion in the mid LAD with a hemodynamically significant FFR.  He underwent successful drug-eluting stent placement to this vessel.  He subsequently raced the [employer] without any difficulty.      Today he continues to feel well.  He specifically denies any chest pain, dyspnea on exertion, PND, orthopnea or lower extremity edema.  Denies any syncope or presyncope.  He had been walking and biking regularly without any limitations, but did have a nasty fall last summer.  Subsequently he is recovered and raced the [employer] virtually  without any difficulty.  He denies any syncope or presyncope.      He has been taking all his medications as prescribed.  He has received a second COVID-19 vaccination and has tolerated it well.     PHYSICAL EXAMINATION:  Dictated below.     I reviewed an echocardiogram from 10/5/2020.  This demonstrated normal left ventricular size and systolic function with no significant valvular abnormalities.     LABORATORY:  Labs on 04/07/2021 demonstrated total  cholesterol 125, triglycerides of 59, HDL of 52, LDL of 61.      IMPRESSION:   1.  Coronary artery disease, as described above.  Remains on aspirin 81 mg daily, atorvastatin 80 mg daily, and Toprol-XL 12.5 mg daily.  2.  Dyslipidemia.  Lipids at goal on atorvastatin 80 mg daily.    The patient is doing well overall from a cardiovascular standpoint without any evidence of angina or congestive heart failure.  I will plan on seeing him in follow-up in approximately 1 year or sooner if his clinical condition dictates otherwise.    It was a pleasure seeing him today.                CURRENT MEDICATIONS:  Current Outpatient Medications   Medication Sig Dispense Refill     aspirin 81 MG EC tablet Take 1 tablet (81 mg) by mouth daily 1 tablet 0     atorvastatin (LIPITOR) 80 MG tablet Take 1 tablet (80 mg) by mouth daily 90 tablet 1     Fexofenadine HCl (ALLEGRA ALLERGY PO) Take by mouth as needed        metoprolol succinate ER (TOPROL-XL) 25 MG 24 hr tablet Take 0.5 tablets (12.5 mg) by mouth daily 45 tablet 3     nitroGLYcerin (NITROSTAT) 0.4 MG sublingual tablet Place 1 tablet (0.4 mg) under the tongue every 5 minutes as needed for chest pain If symptoms persist call 911 20 tablet 2       ALLERGIES     Allergies   Allergen Reactions     Seasonal Allergies        PAST MEDICAL HISTORY:  Past Medical History:   Diagnosis Date     Allergic rhinitis, cause unspecified      CAD (coronary artery disease) 10/2012    95% narrowing ramus intermedius     Contact dermatitis and other eczema, due to unspecified cause      Hyperlipidemia LDL goal <70     mixed     Impaired fasting glucose      Mixed hyperlipidemia      Sleep apnea        PAST SURGICAL HISTORY:  Past Surgical History:   Procedure Laterality Date     CL AFF SURGICAL PATHOLOGY      lipoma excision, left trunk     COLONOSCOPY N/A 12/12/2017    Procedure: COLONOSCOPY;  COLONOSCOPY;  Surgeon: Michael Kramer MD;  Location:  GI     ESOPHAGOSCOPY, GASTROSCOPY,  DUODENOSCOPY (EGD), COMBINED  10/21/2011    Procedure:COMBINED ESOPHAGOSCOPY, GASTROSCOPY, DUODENOSCOPY (EGD), BIOPSY SINGLE OR MULTIPLE; Surgeon:MATTHEW SALMERON; Location: GI     HC COLONOSCOPY THRU STOMA, DIAGNOSTIC      NL, repeat in 10 years     HEART CATH, ANGIOPLASTY  10-01-12    PTCA and implantation of 3.0 x 18 mm length Medtronic Resolute zotarolimus-eluting stent in the mid segment of the ramus intermedius branch      HEART CATH, ANGIOPLASTY  2018    DANIELA to LAD     VASECTOMY         FAMILY HISTORY:  Family History   Problem Relation Age of Onset     Lipids Mother      Cancer Mother         lung, smoker     Lipids Father      Neurologic Disorder Brother         syncope     Heart Disease Maternal Grandfather          of MI     Connective Tissue Disorder Daughter         Sjogren's syndrome       SOCIAL HISTORY:  Social History     Socioeconomic History     Marital status:      Spouse name: Not on file     Number of children: Not on file     Years of education: Not on file     Highest education level: Not on file   Occupational History     Not on file   Social Needs     Financial resource strain: Not on file     Food insecurity     Worry: Not on file     Inability: Not on file     Transportation needs     Medical: Not on file     Non-medical: Not on file   Tobacco Use     Smoking status: Never Smoker     Smokeless tobacco: Never Used   Substance and Sexual Activity     Alcohol use: Yes     Comment: 2 beer per day     Drug use: No     Sexual activity: Yes     Partners: Female   Lifestyle     Physical activity     Days per week: Not on file     Minutes per session: Not on file     Stress: Not on file   Relationships     Social connections     Talks on phone: Not on file     Gets together: Not on file     Attends [employer] service: Not on file     Active member of club or organization: Not on file     Attends meetings of clubs or organizations: Not on file     Relationship status: Not  on file     Intimate partner violence     Fear of current or ex partner: Not on file     Emotionally abused: Not on file     Physically abused: Not on file     Forced sexual activity: Not on file   Other Topics Concern     Parent/sibling w/ CABG, MI or angioplasty before 65F 55M? No      Service Not Asked     Blood Transfusions Not Asked     Caffeine Concern No     Comment: 2 pops a day     Occupational Exposure Not Asked     Hobby Hazards Not Asked     Sleep Concern No     Stress Concern No     Weight Concern No     Special Diet No     Back Care Not Asked     Exercise Yes     Comment: cross country skiing, biking      Bike Helmet Yes     Seat Belt Yes     Self-Exams Not Asked   Social History Narrative     Not on file       Review of Systems:  Skin:          Eyes:         ENT:         Respiratory:          Cardiovascular:         Gastroenterology:        Genitourinary:         Musculoskeletal:         Neurologic:         Psychiatric:         Heme/Lymph/Imm:         Endocrine:           Physical Exam:  Vitals: There were no vitals taken for this visit.    Constitutional:           Skin:             Head:           Eyes:           Lymph:      ENT:           Neck:           Respiratory:            Cardiac:                                                           GI:           Extremities and Muscular Skeletal:                 Neurological:           Psych:           CC  No referring provider defined for this encounter.                  Thank you for allowing me to participate in the care of your patient.      Sincerely,     Amandeep Clark MD     [facility]  cc:   No referring provider defined for this encounter.

## 2021-04-08 NOTE — LETTER
Date:May 28, 2021      Patient was self referred, no letter generated. Do not send.        [facility] Health Information

## 2022-03-15 ENCOUNTER — TELEPHONE (OUTPATIENT)
Dept: CARDIOLOGY | Facility: CLINIC | Age: 67
End: 2022-03-15
Payer: COMMERCIAL

## 2022-03-15 DIAGNOSIS — I25.10 CAD S/P PERCUTANEOUS CORONARY ANGIOPLASTY: Primary | ICD-10-CM

## 2022-03-15 DIAGNOSIS — I25.10 CORONARY ARTERY DISEASE INVOLVING NATIVE CORONARY ARTERY OF NATIVE HEART WITHOUT ANGINA PECTORIS: ICD-10-CM

## 2022-03-15 DIAGNOSIS — Z98.61 CAD S/P PERCUTANEOUS CORONARY ANGIOPLASTY: Primary | ICD-10-CM

## 2022-03-15 NOTE — TELEPHONE ENCOUNTER
Patient called, due for annual visit and wondering if he needed any imaging test prior.  Patient states he is feeling well. Denies any discomfort of chest pain, shortness or breath or dizziness.    Last echo done 4/2000  Last Nuclear done 1/2018.     No need  To return call unless test is recommended.

## 2022-04-20 DIAGNOSIS — I25.10 CAD S/P PERCUTANEOUS CORONARY ANGIOPLASTY: ICD-10-CM

## 2022-04-20 DIAGNOSIS — R07.89 CHEST PAIN, MIDSTERNAL: ICD-10-CM

## 2022-04-20 DIAGNOSIS — Z98.61 CAD S/P PERCUTANEOUS CORONARY ANGIOPLASTY: ICD-10-CM

## 2022-04-20 RX ORDER — METOPROLOL SUCCINATE 25 MG/1
12.5 TABLET, EXTENDED RELEASE ORAL DAILY
Qty: 45 TABLET | Refills: 0 | Status: SHIPPED | OUTPATIENT
Start: 2022-04-20 | End: 2022-08-09

## 2022-04-20 RX ORDER — ATORVASTATIN CALCIUM 80 MG/1
80 TABLET, FILM COATED ORAL DAILY
Qty: 90 TABLET | Refills: 0 | Status: SHIPPED | OUTPATIENT
Start: 2022-04-20 | End: 2022-08-09

## 2022-07-12 ENCOUNTER — HOSPITAL ENCOUNTER (OUTPATIENT)
Dept: CARDIOLOGY | Facility: CLINIC | Age: 67
Discharge: HOME | End: 2022-07-12
Attending: INTERNAL MEDICINE | Admitting: INTERNAL MEDICINE
Payer: COMMERCIAL

## 2022-07-12 ENCOUNTER — LAB (OUTPATIENT)
Dept: LAB | Facility: CLINIC | Age: 67
End: 2022-07-12
Payer: COMMERCIAL

## 2022-07-12 DIAGNOSIS — Z98.61 CAD S/P PERCUTANEOUS CORONARY ANGIOPLASTY: ICD-10-CM

## 2022-07-12 DIAGNOSIS — I25.10 CAD S/P PERCUTANEOUS CORONARY ANGIOPLASTY: ICD-10-CM

## 2022-07-12 DIAGNOSIS — I25.10 CORONARY ARTERY DISEASE INVOLVING NATIVE CORONARY ARTERY OF NATIVE HEART WITHOUT ANGINA PECTORIS: ICD-10-CM

## 2022-07-12 LAB
ALT SERPL W P-5'-P-CCNC: 34 U/L (ref 0–70)
ANION GAP SERPL CALCULATED.3IONS-SCNC: 2 MMOL/L (ref 3–14)
BUN SERPL-MCNC: 20 MG/DL (ref 7–30)
CALCIUM SERPL-MCNC: 9 MG/DL (ref 8.5–10.1)
CHLORIDE BLD-SCNC: 106 MMOL/L (ref 94–109)
CHOLEST SERPL-MCNC: 138 MG/DL
CO2 SERPL-SCNC: 32 MMOL/L (ref 20–32)
CREAT SERPL-MCNC: 1.03 MG/DL (ref 0.66–1.25)
FASTING STATUS PATIENT QL REPORTED: YES
GFR SERPL CREATININE-BSD FRML MDRD: 81 ML/MIN/1.73M2
GLUCOSE BLD-MCNC: 115 MG/DL (ref 70–99)
HDLC SERPL-MCNC: 46 MG/DL
LDLC SERPL CALC-MCNC: 69 MG/DL
LVEF ECHO: NORMAL
NONHDLC SERPL-MCNC: 92 MG/DL
POTASSIUM BLD-SCNC: 4.2 MMOL/L (ref 3.4–5.3)
SODIUM SERPL-SCNC: 140 MMOL/L (ref 133–144)
TRIGL SERPL-MCNC: 116 MG/DL

## 2022-07-12 PROCEDURE — 80061 LIPID PANEL: CPT | Performed by: INTERNAL MEDICINE

## 2022-07-12 PROCEDURE — 255N000002 HC RX 255 OP 636: Performed by: INTERNAL MEDICINE

## 2022-07-12 PROCEDURE — 84460 ALANINE AMINO (ALT) (SGPT): CPT | Performed by: INTERNAL MEDICINE

## 2022-07-12 PROCEDURE — 93306 TTE W/DOPPLER COMPLETE: CPT | Mod: 26 | Performed by: INTERNAL MEDICINE

## 2022-07-12 PROCEDURE — 36415 COLL VENOUS BLD VENIPUNCTURE: CPT | Performed by: INTERNAL MEDICINE

## 2022-07-12 PROCEDURE — 80048 BASIC METABOLIC PNL TOTAL CA: CPT | Performed by: INTERNAL MEDICINE

## 2022-07-12 PROCEDURE — 999N000208 ECHOCARDIOGRAM COMPLETE

## 2022-07-12 RX ADMIN — HUMAN ALBUMIN MICROSPHERES AND PERFLUTREN 9 ML: 10; .22 INJECTION, SOLUTION INTRAVENOUS at 08:26

## 2022-07-19 NOTE — PROGRESS NOTES
HPI and Plan:     I had the pleasure of seeing Mr. Corea in followup at the [facility] today.  He is a very pleasant 66-year-old gentleman who I last saw in 04/2021. He has a history of coronary artery disease and is status post stenting of a 95% lesion in the ramus intermedius in 09/2012 for exertional angina.      In 2018 he was planning to race the [employer], and given known moderate LAD disease, I repeated a stress perfusion study at the time.  This demonstrated evidence of anterior ischemia, and given his known anatomy, I referred him for coronary angiography.        Coronary angiography was performed on 01/31/2018 and demonstrated a 75%-80% lesion in the mid LAD with a hemodynamically significant FFR.  He underwent successful drug-eluting stent placement to this vessel.  He subsequently raced the [employer] without any difficulty.      Today he continues to feel well.  He specifically denies any chest pain, dyspnea on exertion, PND, orthopnea or lower extremity edema.  Denies any syncope or presyncope.  He had been walking and biking regularly without any limitations.  He plans to race the [employer] later on this year.      He has been taking all his medications as prescribed.       PHYSICAL EXAMINATION:  Dictated below.     LABORATORY: Labs on 7/12/2022 demonstrated total cholesterol 138, HDL 46, LDL 69 and triglycerides 116.    His echocardiogram on 7/12/2022 demonstrated normal left ventricular size and systolic function with no significant valvular disease.     IMPRESSION:   1.  Coronary artery disease, as described above.  Remains on aspirin 81 mg daily, atorvastatin 80 mg daily, and Toprol-XL 12.5 mg daily.  2.  Dyslipidemia.  Lipids at goal on atorvastatin 80 mg daily.    The patient is doing very well overall from a cardiovascular standpoint.  His recent echocardiographic and lipid findings are reassuring.  His exercise tolerance remains outstanding.    At this point  in time I would recommend follow-up in approximately 2 years or sooner if his clinical condition dictates otherwise.  It was a pleasure seeing him today.                      CURRENT MEDICATIONS:  Current Outpatient Medications   Medication Sig Dispense Refill     aspirin 81 MG EC tablet Take 1 tablet (81 mg) by mouth daily 1 tablet 0     atorvastatin (LIPITOR) 80 MG tablet Take 1 tablet (80 mg) by mouth daily 90 tablet 0     Fexofenadine HCl (ALLEGRA ALLERGY PO) Take by mouth as needed        metoprolol succinate ER (TOPROL-XL) 25 MG 24 hr tablet Take 0.5 tablets (12.5 mg) by mouth daily 45 tablet 0     nitroGLYcerin (NITROSTAT) 0.4 MG sublingual tablet Place 1 tablet (0.4 mg) under the tongue every 5 minutes as needed for chest pain If symptoms persist call 911 20 tablet 2       ALLERGIES     Allergies   Allergen Reactions     Seasonal Allergies        PAST MEDICAL HISTORY:  Past Medical History:   Diagnosis Date     Allergic rhinitis, cause unspecified      CAD (coronary artery disease) 10/2012    95% narrowing ramus intermedius     Contact dermatitis and other eczema, due to unspecified cause      Hyperlipidemia LDL goal <70     mixed     Impaired fasting glucose      Mixed hyperlipidemia      Sleep apnea        PAST SURGICAL HISTORY:  Past Surgical History:   Procedure Laterality Date     CL AFF SURGICAL PATHOLOGY      lipoma excision, left trunk     COLONOSCOPY N/A 12/12/2017    Procedure: COLONOSCOPY;  COLONOSCOPY;  Surgeon: Michael Kramer MD;  Location:  GI     ESOPHAGOSCOPY, GASTROSCOPY, DUODENOSCOPY (EGD), COMBINED  10/21/2011    Procedure:COMBINED ESOPHAGOSCOPY, GASTROSCOPY, DUODENOSCOPY (EGD), BIOPSY SINGLE OR MULTIPLE; Surgeon:MATTHEW SALMERON; Location: GI     HEART CATH, ANGIOPLASTY  10-01-12    PTCA and implantation of 3.0 x 18 mm length Medtronic Resolute zotarolimus-eluting stent in the mid segment of the ramus intermedius branch      HEART CATH, ANGIOPLASTY  01/31/2018    DANIELA to LAD      VASECTOMY       ZZHC COLONOSCOPY THRU STOMA, DIAGNOSTIC  2007    NL, repeat in 10 years       FAMILY HISTORY:  Family History   Problem Relation Age of Onset     Lipids Mother      Cancer Mother         lung, smoker     Lipids Father      Neurologic Disorder Brother         syncope     Heart Disease Maternal Grandfather          of MI     Connective Tissue Disorder Daughter         Sjogren's syndrome       SOCIAL HISTORY:  Social History     Socioeconomic History     Marital status:    Tobacco Use     Smoking status: Never Smoker     Smokeless tobacco: Never Used   Substance and Sexual Activity     Alcohol use: Yes     Comment: 2 beer per day     Drug use: No     Sexual activity: Yes     Partners: Female   Other Topics Concern     Parent/sibling w/ CABG, MI or angioplasty before 65F 55M? No     Caffeine Concern No     Comment: 2 pops a day     Sleep Concern No     Stress Concern No     Weight Concern No     Special Diet No     Exercise Yes     Comment: cross country skiing, biking      Bike Helmet Yes     Seat Belt Yes       Review of Systems:  Skin:          Eyes:         ENT:         Respiratory:          Cardiovascular:         Gastroenterology:        Genitourinary:         Musculoskeletal:         Neurologic:         Psychiatric:         Heme/Lymph/Imm:         Endocrine:           Physical Exam:  Vitals: There were no vitals taken for this visit.    Constitutional:  cooperative, alert and oriented, well developed, well nourished, in no acute distress        Skin:  warm and dry to the touch, no apparent skin lesions or masses noted          Head:  normocephalic, no masses or lesions        Eyes:  pupils equal and round        Lymph:      ENT:  no pallor or cyanosis, dentition good        Neck:  JVP normal        Respiratory:  clear to auscultation         Cardiac: regular rhythm                pulses full and equal                                        GI:  abdomen soft        Extremities  and Muscular Skeletal:  no edema              Neurological:  no gross motor deficits        Psych:  Alert and Oriented x 3        CC  Amandeep Clark MD  [id number]  [place] [id number]

## 2022-07-21 ENCOUNTER — OFFICE VISIT (OUTPATIENT)
Dept: CARDIOLOGY | Facility: CLINIC | Age: 67
End: 2022-07-21
Payer: COMMERCIAL

## 2022-07-21 VITALS
OXYGEN SATURATION: 97 % | DIASTOLIC BLOOD PRESSURE: 60 MMHG | HEART RATE: 50 BPM | WEIGHT: 191 LBS | HEIGHT: 69 IN | BODY MASS INDEX: 28.29 KG/M2 | SYSTOLIC BLOOD PRESSURE: 122 MMHG

## 2022-07-21 DIAGNOSIS — I25.10 CORONARY ARTERY DISEASE INVOLVING NATIVE CORONARY ARTERY OF NATIVE HEART WITHOUT ANGINA PECTORIS: Primary | ICD-10-CM

## 2022-07-21 PROCEDURE — 99214 OFFICE O/P EST MOD 30 MIN: CPT | Performed by: INTERNAL MEDICINE

## 2022-07-21 NOTE — LETTER
7/21/2022    NAYA Chiu CNP  No address on file    RE: Keith Corea       Dear Colleague,     I had the pleasure of seeing Keith Corea in the [facility].  HPI and Plan:     I had the pleasure of seeing Mr. Corea in followup at the [facility] today.  He is a very pleasant 66-year-old gentleman who I last saw in 04/2021. He has a history of coronary artery disease and is status post stenting of a 95% lesion in the ramus intermedius in 09/2012 for exertional angina.      In 2018 he was planning to race the [employer], and given known moderate LAD disease, I repeated a stress perfusion study at the time.  This demonstrated evidence of anterior ischemia, and given his known anatomy, I referred him for coronary angiography.        Coronary angiography was performed on 01/31/2018 and demonstrated a 75%-80% lesion in the mid LAD with a hemodynamically significant FFR.  He underwent successful drug-eluting stent placement to this vessel.  He subsequently raced the [employer] without any difficulty.      Today he continues to feel well.  He specifically denies any chest pain, dyspnea on exertion, PND, orthopnea or lower extremity edema.  Denies any syncope or presyncope.  He had been walking and biking regularly without any limitations.  He plans to race the [employer] later on this year.      He has been taking all his medications as prescribed.       PHYSICAL EXAMINATION:  Dictated below.     LABORATORY: Labs on 7/12/2022 demonstrated total cholesterol 138, HDL 46, LDL 69 and triglycerides 116.    His echocardiogram on 7/12/2022 demonstrated normal left ventricular size and systolic function with no significant valvular disease.     IMPRESSION:   1.  Coronary artery disease, as described above.  Remains on aspirin 81 mg daily, atorvastatin 80 mg daily, and Toprol-XL 12.5 mg daily.  2.  Dyslipidemia.  Lipids at goal on atorvastatin 80 mg daily.    The patient  is doing very well overall from a cardiovascular standpoint.  His recent echocardiographic and lipid findings are reassuring.  His exercise tolerance remains outstanding.    At this point in time I would recommend follow-up in approximately 2 years or sooner if his clinical condition dictates otherwise.  It was a pleasure seeing him today.                      CURRENT MEDICATIONS:  Current Outpatient Medications   Medication Sig Dispense Refill     aspirin 81 MG EC tablet Take 1 tablet (81 mg) by mouth daily 1 tablet 0     atorvastatin (LIPITOR) 80 MG tablet Take 1 tablet (80 mg) by mouth daily 90 tablet 0     Fexofenadine HCl (ALLEGRA ALLERGY PO) Take by mouth as needed        metoprolol succinate ER (TOPROL-XL) 25 MG 24 hr tablet Take 0.5 tablets (12.5 mg) by mouth daily 45 tablet 0     nitroGLYcerin (NITROSTAT) 0.4 MG sublingual tablet Place 1 tablet (0.4 mg) under the tongue every 5 minutes as needed for chest pain If symptoms persist call 911 20 tablet 2       ALLERGIES     Allergies   Allergen Reactions     Seasonal Allergies        PAST MEDICAL HISTORY:  Past Medical History:   Diagnosis Date     Allergic rhinitis, cause unspecified      CAD (coronary artery disease) 10/2012    95% narrowing ramus intermedius     Contact dermatitis and other eczema, due to unspecified cause      Hyperlipidemia LDL goal <70     mixed     Impaired fasting glucose      Mixed hyperlipidemia      Sleep apnea        PAST SURGICAL HISTORY:  Past Surgical History:   Procedure Laterality Date     CL AFF SURGICAL PATHOLOGY      lipoma excision, left trunk     COLONOSCOPY N/A 12/12/2017    Procedure: COLONOSCOPY;  COLONOSCOPY;  Surgeon: Michael Kramer MD;  Location:  GI     ESOPHAGOSCOPY, GASTROSCOPY, DUODENOSCOPY (EGD), COMBINED  10/21/2011    Procedure:COMBINED ESOPHAGOSCOPY, GASTROSCOPY, DUODENOSCOPY (EGD), BIOPSY SINGLE OR MULTIPLE; Surgeon:MATTHEW SALMERON; Location: GI     HEART CATH, ANGIOPLASTY  10-01-12    PTCA  and implantation of 3.0 x 18 mm length Medtronic Resolute zotarolimus-eluting stent in the mid segment of the ramus intermedius branch      HEART CATH, ANGIOPLASTY  2018    DANIELA to LAD     VASECTOMY       ZZHC COLONOSCOPY THRU STOMA, DIAGNOSTIC      NL, repeat in 10 years       FAMILY HISTORY:  Family History   Problem Relation Age of Onset     Lipids Mother      Cancer Mother         lung, smoker     Lipids Father      Neurologic Disorder Brother         syncope     Heart Disease Maternal Grandfather          of MI     Connective Tissue Disorder Daughter         Sjogren's syndrome       SOCIAL HISTORY:  Social History     Socioeconomic History     Marital status:    Tobacco Use     Smoking status: Never Smoker     Smokeless tobacco: Never Used   Substance and Sexual Activity     Alcohol use: Yes     Comment: 2 beer per day     Drug use: No     Sexual activity: Yes     Partners: Female   Other Topics Concern     Parent/sibling w/ CABG, MI or angioplasty before 65F 55M? No     Caffeine Concern No     Comment: 2 pops a day     Sleep Concern No     Stress Concern No     Weight Concern No     Special Diet No     Exercise Yes     Comment: cross country skiing, biking      Bike Helmet Yes     Seat Belt Yes       Review of Systems:  Skin:          Eyes:         ENT:         Respiratory:          Cardiovascular:         Gastroenterology:        Genitourinary:         Musculoskeletal:         Neurologic:         Psychiatric:         Heme/Lymph/Imm:         Endocrine:           Physical Exam:  Vitals: There were no vitals taken for this visit.    Constitutional:  cooperative, alert and oriented, well developed, well nourished, in no acute distress        Skin:  warm and dry to the touch, no apparent skin lesions or masses noted          Head:  normocephalic, no masses or lesions        Eyes:  pupils equal and round        Lymph:      ENT:  no pallor or cyanosis, dentition good        Neck:  JVP normal         Respiratory:  clear to auscultation         Cardiac: regular rhythm                pulses full and equal                                        GI:  abdomen soft        Extremities and Muscular Skeletal:  no edema              Neurological:  no gross motor deficits        Psych:  Alert and Oriented x 3        CC  Amandeep Clark MD  [address]  [place] [id number]                  Thank you for allowing me to participate in the care of your patient.      Sincerely,     Amandeep Clark MD     [facility]  cc:   Amandeep Clark MD  [address]  [place] [id number]

## 2022-07-21 NOTE — LETTER
Date:July 21, 2022      Provider requested that no letter be sent. Do not send.       [facility]

## 2022-08-09 DIAGNOSIS — R07.89 CHEST PAIN, MIDSTERNAL: ICD-10-CM

## 2022-08-09 DIAGNOSIS — Z98.61 CAD S/P PERCUTANEOUS CORONARY ANGIOPLASTY: ICD-10-CM

## 2022-08-09 DIAGNOSIS — I25.10 CAD S/P PERCUTANEOUS CORONARY ANGIOPLASTY: ICD-10-CM

## 2022-08-09 RX ORDER — METOPROLOL SUCCINATE 25 MG/1
12.5 TABLET, EXTENDED RELEASE ORAL DAILY
Qty: 45 TABLET | Refills: 3 | Status: SHIPPED | OUTPATIENT
Start: 2022-08-09 | End: 2023-08-15

## 2022-08-09 RX ORDER — ATORVASTATIN CALCIUM 80 MG/1
80 TABLET, FILM COATED ORAL DAILY
Qty: 90 TABLET | Refills: 3 | Status: SHIPPED | OUTPATIENT
Start: 2022-08-09 | End: 2023-08-15

## 2022-12-08 ENCOUNTER — TRANSFERRED RECORDS (OUTPATIENT)
Dept: HEALTH INFORMATION MANAGEMENT | Facility: CLINIC | Age: 67
End: 2022-12-08

## 2023-08-15 DIAGNOSIS — Z98.61 CAD S/P PERCUTANEOUS CORONARY ANGIOPLASTY: ICD-10-CM

## 2023-08-15 DIAGNOSIS — I25.10 CAD S/P PERCUTANEOUS CORONARY ANGIOPLASTY: ICD-10-CM

## 2023-08-15 DIAGNOSIS — R07.89 CHEST PAIN, MIDSTERNAL: ICD-10-CM

## 2023-08-15 RX ORDER — METOPROLOL SUCCINATE 25 MG/1
12.5 TABLET, EXTENDED RELEASE ORAL DAILY
Qty: 45 TABLET | Refills: 4 | Status: SHIPPED | OUTPATIENT
Start: 2023-08-15

## 2023-08-15 RX ORDER — ATORVASTATIN CALCIUM 80 MG/1
80 TABLET, FILM COATED ORAL DAILY
Qty: 90 TABLET | Refills: 4 | Status: SHIPPED | OUTPATIENT
Start: 2023-08-15

## 2023-08-15 NOTE — TELEPHONE ENCOUNTER
Received refill request for:  Metoprolol succinate, Atorvastatin    [facility] Refill Guideline reviewed.  Medication meets criteria for refill.    Jackie Gilliam RN, BSN  08/15/23 at 8:55 AM

## 2024-07-03 ENCOUNTER — TELEPHONE (OUTPATIENT)
Dept: CARDIOLOGY | Facility: CLINIC | Age: 69
End: 2024-07-03
Payer: COMMERCIAL

## 2024-07-03 NOTE — TELEPHONE ENCOUNTER
2nd attempt- Left voicemail for the patient to call back and schedule the following:    Appointment type:  Return Cardiology  Provider:  Dr Clark  Return date:  next available  Additional appointment(s) needed:    Additonal Notes:    Specialty phone number: [phone]

## 2024-11-18 DIAGNOSIS — I25.10 CAD S/P PERCUTANEOUS CORONARY ANGIOPLASTY: ICD-10-CM

## 2024-11-18 DIAGNOSIS — R07.89 CHEST PAIN, MIDSTERNAL: ICD-10-CM

## 2024-11-18 DIAGNOSIS — Z98.61 CAD S/P PERCUTANEOUS CORONARY ANGIOPLASTY: ICD-10-CM

## 2024-11-18 RX ORDER — METOPROLOL SUCCINATE 25 MG/1
12.5 TABLET, EXTENDED RELEASE ORAL DAILY
Qty: 45 TABLET | Refills: 0 | Status: SHIPPED | OUTPATIENT
Start: 2024-11-18

## 2024-11-18 RX ORDER — ATORVASTATIN CALCIUM 80 MG/1
80 TABLET, FILM COATED ORAL DAILY
Qty: 90 TABLET | Refills: 0 | Status: SHIPPED | OUTPATIENT
Start: 2024-11-18

## 2024-12-18 NOTE — PROGRESS NOTES
HPI and Plan:       I had the pleasure of seeing Mr. Corea in followup at the [facility] today.  He is a very pleasant 69-year-old gentleman who I last saw in 7/2022. He has a history of coronary artery disease and is status post stenting of a 95% lesion in the ramus intermedius in 09/2012 for exertional angina.      In 2018 he was planning to race the [employer], and given known moderate LAD disease, I repeated a stress perfusion study at the time.  This demonstrated evidence of anterior ischemia, and given his known anatomy, I referred him for coronary angiography.        Coronary angiography was performed on 01/31/2018 and demonstrated a 75%-80% lesion in the mid LAD with a hemodynamically significant FFR.  He underwent successful drug-eluting stent placement to this vessel.  He subsequently raced the [employer] without any difficulty.      Today he continues to feel well.  He denies any exertional chest discomfort, palpitations, dyspnea, syncope or presyncope.  He denies any PND or orthopnea.  He remains compliant with his medications.    He has retired since I last saw him and has taken advantage of this extra time to ski is much as possible.  He does bike during the summers.  He is actually lost over 10 pounds since I saw him, probably secondary to his increased activity.    He does mention some lower extremity stiffness that occurs at rest and typically  resolves with activity.  He is wondering if his statin agent could be responsible.  Above      He has been taking all his medications as prescribed.       PHYSICAL EXAMINATION:  Dictated below.     LABORATORY: Labs on 7/9/2024 demonstrated total cholesterol 194, LDL of 110 and triglycerides of 100.    His echocardiogram on 7/12/2022 demonstrated normal left ventricular size and systolic function with no significant valvular disease.     IMPRESSION:   1.  Coronary artery disease, as described above.  Remains on aspirin 81 mg  daily, atorvastatin 80 mg daily, and Toprol-XL 12.5 mg daily.  2.  Dyslipidemia.  Lipids above goal on atorvastatin 80 mg daily.    PLAN    Mr. Corea Is doing well overall from a cardiovascular standpoint.  There is no evidence of angina or congestive heart failure.  I am puzzled by the results of his most recent lipid panel, given that he has lost weight and is fully compliant with his atorvastatin.  We will recheck these numbers today and check an LP(a) for further risk stratification.    I will also order a stress echocardiogram to rule out progressive obstructive coronary artery disease.  Assuming the findings are within normal limits, I will plan on following up with him in approximately 2 years.    It was a pleasure seeing him in follow-up today.        Or congestive heart failure.  His exertional capacity remains preserved.    CURRENT MEDICATIONS:  Current Outpatient Medications   Medication Sig Dispense Refill    aspirin 81 MG EC tablet Take 1 tablet (81 mg) by mouth daily 1 tablet 0    atorvastatin (LIPITOR) 80 MG tablet Take 1 tablet (80 mg) by mouth daily. 90 tablet 0    Fexofenadine HCl (ALLEGRA ALLERGY PO) Take by mouth as needed       metoprolol succinate ER (TOPROL XL) 25 MG 24 hr tablet Take 0.5 tablets (12.5 mg) by mouth daily. 45 tablet 0    nitroGLYcerin (NITROSTAT) 0.4 MG sublingual tablet Place 1 tablet (0.4 mg) under the tongue every 5 minutes as needed for chest pain If symptoms persist call 911 20 tablet 2       ALLERGIES     Allergies   Allergen Reactions    Seasonal Allergies        PAST MEDICAL HISTORY:  Past Medical History:   Diagnosis Date    Allergic rhinitis, cause unspecified     CAD (coronary artery disease) 10/2012    95% narrowing ramus intermedius    Contact dermatitis and other eczema, due to unspecified cause     Hyperlipidemia LDL goal <70     mixed    Impaired fasting glucose     Mixed hyperlipidemia     Sleep apnea        PAST SURGICAL HISTORY:  Past Surgical History:    Procedure Laterality Date    CL AFF SURGICAL PATHOLOGY      lipoma excision, left trunk    COLONOSCOPY N/A 2017    Procedure: COLONOSCOPY;  COLONOSCOPY;  Surgeon: Michael Kramer MD;  Location:  GI    ESOPHAGOSCOPY, GASTROSCOPY, DUODENOSCOPY (EGD), COMBINED  10/21/2011    Procedure:COMBINED ESOPHAGOSCOPY, GASTROSCOPY, DUODENOSCOPY (EGD), BIOPSY SINGLE OR MULTIPLE; Surgeon:MATTHEW SALMERON; Location: GI    HEART CATH, ANGIOPLASTY  10-01-12    PTCA and implantation of 3.0 x 18 mm length Medtronic Resolute zotarolimus-eluting stent in the mid segment of the ramus intermedius branch     HEART CATH, ANGIOPLASTY  2018    DANIELA to LAD    VASECTOMY      ZZHC COLONOSCOPY THRU STOMA, DIAGNOSTIC      NL, repeat in 10 years       FAMILY HISTORY:  Family History   Problem Relation Age of Onset    Lipids Mother     Cancer Mother         lung, smoker    Lipids Father     Neurologic Disorder Brother         syncope    Heart Disease Maternal Grandfather          of MI    Connective Tissue Disorder Daughter         Sjogren's syndrome       SOCIAL HISTORY:  Social History     Socioeconomic History    Marital status:    Tobacco Use    Smoking status: Never    Smokeless tobacco: Never   Substance and Sexual Activity    Alcohol use: Yes     Comment: 2 beer per day    Drug use: No    Sexual activity: Yes     Partners: Female   Other Topics Concern    Parent/sibling w/ CABG, MI or angioplasty before 65F 55M? No    Caffeine Concern No     Comment: 2 pops a day    Sleep Concern No    Stress Concern No    Weight Concern No    Special Diet No    Exercise Yes     Comment: cross country skiing, biking     Bike Helmet Yes    Seat Belt Yes       Review of Systems:  Skin:          Eyes:         ENT:         Respiratory:          Cardiovascular:         Gastroenterology:        Genitourinary:         Musculoskeletal:         Neurologic:         Psychiatric:         Heme/Lymph/Imm:         Endocrine:            Physical Exam:  Vitals: There were no vitals taken for this visit.    Constitutional:           Skin:             Head:           Eyes:           Lymph:      ENT:           Neck:           Respiratory:        Clear to auscultation    Cardiac:                Rate and rhythm                                           GI:           Extremities and Muscular Skeletal:                 Neurological:           Psych:             CC  Amandeep Clark MD  [address]  [place] [address]

## 2024-12-19 ENCOUNTER — OFFICE VISIT (OUTPATIENT)
Dept: CARDIOLOGY | Facility: CLINIC | Age: 69
End: 2024-12-19
Payer: COMMERCIAL

## 2024-12-19 ENCOUNTER — LAB (OUTPATIENT)
Dept: LAB | Facility: CLINIC | Age: 69
End: 2024-12-19
Payer: COMMERCIAL

## 2024-12-19 VITALS
WEIGHT: 178.7 LBS | DIASTOLIC BLOOD PRESSURE: 73 MMHG | OXYGEN SATURATION: 97 % | HEIGHT: 69 IN | HEART RATE: 54 BPM | BODY MASS INDEX: 26.47 KG/M2 | SYSTOLIC BLOOD PRESSURE: 122 MMHG

## 2024-12-19 DIAGNOSIS — Z98.61 CAD S/P PERCUTANEOUS CORONARY ANGIOPLASTY: ICD-10-CM

## 2024-12-19 DIAGNOSIS — R07.89 CHEST PAIN, MIDSTERNAL: ICD-10-CM

## 2024-12-19 DIAGNOSIS — I25.10 CORONARY ARTERY DISEASE INVOLVING NATIVE CORONARY ARTERY OF NATIVE HEART WITHOUT ANGINA PECTORIS: ICD-10-CM

## 2024-12-19 DIAGNOSIS — I25.10 CAD S/P PERCUTANEOUS CORONARY ANGIOPLASTY: ICD-10-CM

## 2024-12-19 LAB
APO A-I SERPL-MCNC: 91 MG/DL
CHOLEST SERPL-MCNC: 155 MG/DL
FASTING STATUS PATIENT QL REPORTED: YES
HDLC SERPL-MCNC: 49 MG/DL
LDLC SERPL CALC-MCNC: 84 MG/DL
NONHDLC SERPL-MCNC: 106 MG/DL
TRIGL SERPL-MCNC: 111 MG/DL

## 2024-12-19 PROCEDURE — 83695 ASSAY OF LIPOPROTEIN(A): CPT

## 2024-12-19 PROCEDURE — 82465 ASSAY BLD/SERUM CHOLESTEROL: CPT

## 2024-12-19 PROCEDURE — 36415 COLL VENOUS BLD VENIPUNCTURE: CPT

## 2024-12-19 RX ORDER — NITROGLYCERIN 0.4 MG/1
0.4 TABLET SUBLINGUAL EVERY 5 MIN PRN
Qty: 20 TABLET | Refills: 2 | Status: SHIPPED | OUTPATIENT
Start: 2024-12-19

## 2024-12-19 RX ORDER — METOPROLOL SUCCINATE 25 MG/1
12.5 TABLET, EXTENDED RELEASE ORAL DAILY
Qty: 45 TABLET | Refills: 3 | Status: SHIPPED | OUTPATIENT
Start: 2024-12-19

## 2024-12-19 RX ORDER — ATORVASTATIN CALCIUM 80 MG/1
80 TABLET, FILM COATED ORAL DAILY
Qty: 90 TABLET | Refills: 3 | Status: SHIPPED | OUTPATIENT
Start: 2024-12-19

## 2024-12-19 NOTE — LETTER
12/19/2024    NAYA Chiu CNP  No address on file    RE: Keith Corea       Dear Colleague,     I had the pleasure of seeing Keith Corea in the [facility].  HPI and Plan:       I had the pleasure of seeing Mr. oCrea in followup at the [facility] today.  He is a very pleasant 69-year-old gentleman who I last saw in 7/2022. He has a history of coronary artery disease and is status post stenting of a 95% lesion in the ramus intermedius in 09/2012 for exertional angina.      In 2018 he was planning to race the [employer], and given known moderate LAD disease, I repeated a stress perfusion study at the time.  This demonstrated evidence of anterior ischemia, and given his known anatomy, I referred him for coronary angiography.        Coronary angiography was performed on 01/31/2018 and demonstrated a 75%-80% lesion in the mid LAD with a hemodynamically significant FFR.  He underwent successful drug-eluting stent placement to this vessel.  He subsequently raced the [employer] without any difficulty.      Today he continues to feel well.  He denies any exertional chest discomfort, palpitations, dyspnea, syncope or presyncope.  He denies any PND or orthopnea.  He remains compliant with his medications.    He has retired since I last saw him and has taken advantage of this extra time to ski is much as possible.  He does bike during the summers.  He is actually lost over 10 pounds since I saw him, probably secondary to his increased activity.    He does mention some lower extremity stiffness that occurs at rest and typically  resolves with activity.  He is wondering if his statin agent could be responsible.  Above      He has been taking all his medications as prescribed.       PHYSICAL EXAMINATION:  Dictated below.     LABORATORY: Labs on 7/9/2024 demonstrated total cholesterol 194, LDL of 110 and triglycerides of 100.    His echocardiogram on 7/12/2022  demonstrated normal left ventricular size and systolic function with no significant valvular disease.     IMPRESSION:   1.  Coronary artery disease, as described above.  Remains on aspirin 81 mg daily, atorvastatin 80 mg daily, and Toprol-XL 12.5 mg daily.  2.  Dyslipidemia.  Lipids above goal on atorvastatin 80 mg daily.    PLAN    Mr. Corea Is doing well overall from a cardiovascular standpoint.  There is no evidence of angina or congestive heart failure.  I am puzzled by the results of his most recent lipid panel, given that he has lost weight and is fully compliant with his atorvastatin.  We will recheck these numbers today and check an LP(a) for further risk stratification.    I will also order a stress echocardiogram to rule out progressive obstructive coronary artery disease.  Assuming the findings are within normal limits, I will plan on following up with him in approximately 2 years.    It was a pleasure seeing him in follow-up today.        Or congestive heart failure.  His exertional capacity remains preserved.    CURRENT MEDICATIONS:  Current Outpatient Medications   Medication Sig Dispense Refill     aspirin 81 MG EC tablet Take 1 tablet (81 mg) by mouth daily 1 tablet 0     atorvastatin (LIPITOR) 80 MG tablet Take 1 tablet (80 mg) by mouth daily. 90 tablet 0     Fexofenadine HCl (ALLEGRA ALLERGY PO) Take by mouth as needed        metoprolol succinate ER (TOPROL XL) 25 MG 24 hr tablet Take 0.5 tablets (12.5 mg) by mouth daily. 45 tablet 0     nitroGLYcerin (NITROSTAT) 0.4 MG sublingual tablet Place 1 tablet (0.4 mg) under the tongue every 5 minutes as needed for chest pain If symptoms persist call 911 20 tablet 2       ALLERGIES     Allergies   Allergen Reactions     Seasonal Allergies        PAST MEDICAL HISTORY:  Past Medical History:   Diagnosis Date     Allergic rhinitis, cause unspecified      CAD (coronary artery disease) 10/2012    95% narrowing ramus intermedius     Contact dermatitis and other  eczema, due to unspecified cause      Hyperlipidemia LDL goal <70     mixed     Impaired fasting glucose      Mixed hyperlipidemia      Sleep apnea        PAST SURGICAL HISTORY:  Past Surgical History:   Procedure Laterality Date     CL AFF SURGICAL PATHOLOGY      lipoma excision, left trunk     COLONOSCOPY N/A 2017    Procedure: COLONOSCOPY;  COLONOSCOPY;  Surgeon: Michael Kramer MD;  Location:  GI     ESOPHAGOSCOPY, GASTROSCOPY, DUODENOSCOPY (EGD), COMBINED  10/21/2011    Procedure:COMBINED ESOPHAGOSCOPY, GASTROSCOPY, DUODENOSCOPY (EGD), BIOPSY SINGLE OR MULTIPLE; Surgeon:MATTHEW SALMERON; Location: GI     HEART CATH, ANGIOPLASTY  10-01-12    PTCA and implantation of 3.0 x 18 mm length Medtronic Resolute zotarolimus-eluting stent in the mid segment of the ramus intermedius branch      HEART CATH, ANGIOPLASTY  2018    DANIELA to LAD     VASECTOMY       ZZHC COLONOSCOPY THRU STOMA, DIAGNOSTIC      NL, repeat in 10 years       FAMILY HISTORY:  Family History   Problem Relation Age of Onset     Lipids Mother      Cancer Mother         lung, smoker     Lipids Father      Neurologic Disorder Brother         syncope     Heart Disease Maternal Grandfather          of MI     Connective Tissue Disorder Daughter         Sjogren's syndrome       SOCIAL HISTORY:  Social History     Socioeconomic History     Marital status:    Tobacco Use     Smoking status: Never     Smokeless tobacco: Never   Substance and Sexual Activity     Alcohol use: Yes     Comment: 2 beer per day     Drug use: No     Sexual activity: Yes     Partners: Female   Other Topics Concern     Parent/sibling w/ CABG, MI or angioplasty before 65F 55M? No     Caffeine Concern No     Comment: 2 pops a day     Sleep Concern No     Stress Concern No     Weight Concern No     Special Diet No     Exercise Yes     Comment: cross country skiing, biking      Bike Helmet Yes     Seat Belt Yes       Review of Systems:  Skin:           Eyes:         ENT:         Respiratory:          Cardiovascular:         Gastroenterology:        Genitourinary:         Musculoskeletal:         Neurologic:         Psychiatric:         Heme/Lymph/Imm:         Endocrine:           Physical Exam:  Vitals: There were no vitals taken for this visit.    Constitutional:           Skin:             Head:           Eyes:           Lymph:      ENT:           Neck:           Respiratory:        Clear to auscultation    Cardiac:                Rate and rhythm                                           GI:           Extremities and Muscular Skeletal:                 Neurological:           Psych:             CC  Amandeep Clark MD  [address]  [place] [address]                      Thank you for allowing me to participate in the care of your patient.      Sincerely,     Amandeep lCark MD     [facility]  cc:   Amandeep Clark MD  [address]  [place] [address]

## 2024-12-30 ENCOUNTER — TELEPHONE (OUTPATIENT)
Dept: CARDIOLOGY | Facility: CLINIC | Age: 69
End: 2024-12-30

## 2024-12-30 ENCOUNTER — HOSPITAL ENCOUNTER (OUTPATIENT)
Dept: CARDIOLOGY | Facility: CLINIC | Age: 69
Discharge: HOME | End: 2024-12-30
Attending: INTERNAL MEDICINE | Admitting: INTERNAL MEDICINE
Payer: COMMERCIAL

## 2024-12-30 DIAGNOSIS — I25.10 CORONARY ARTERY DISEASE INVOLVING NATIVE CORONARY ARTERY OF NATIVE HEART WITHOUT ANGINA PECTORIS: ICD-10-CM

## 2024-12-30 DIAGNOSIS — R07.89 CHEST PAIN, MIDSTERNAL: ICD-10-CM

## 2024-12-30 DIAGNOSIS — E78.00 PURE HYPERCHOLESTEROLEMIA: Primary | ICD-10-CM

## 2024-12-30 PROCEDURE — 93325 DOPPLER ECHO COLOR FLOW MAPG: CPT | Mod: TC

## 2024-12-30 NOTE — TELEPHONE ENCOUNTER
Stress echo 12/30/2024 noted. Ordered to assess CAD    Lipids and Lp(a) not scheduled yet    Per Dr. Clark's dictation 12/19/2024  I will also order a stress echocardiogram to rule out progressive obstructive coronary artery disease. Assuming the findings are within normal limits, I will plan on following up with him in approximately 2 years.     Stress echo:  Normal resting heart rate and blood pressure with normal response to exercise  Normal exercise capacity for age  Normal resting ECG, no ischemic changes with exercise  No chest pain with exercise  Normal resting LV function with good augmentation of all wall segments  postexercise  This is a normal stress echo indicating low probability of significant  exercise-induced myocardial ischemia  No hemodynamically significant valvular abnormalities on 2D or color flow  imaging.    Will message Dr. Clark to review

## 2024-12-30 NOTE — LETTER
2025       TO: Omid Zimmerman   [address]  [place] [address]       Dear Mr. Zimmerman,    The results of your recent stress echocardiogram.    Dr. Clark reviewed your stress echo results. They are within normal limits. Your next office visit will be due in .    Thank you for scheduling the fasting lipid panel and Lp(a) in on 3/4/2025 at our [facility] on 11:20 AM.    Results for orders placed or performed during the hospital encounter of 24   Exercise Stress Echocardiogram     Status: None    Narrative    [id number]  [id number]  [id number]^ENMANUEL^KEON^PHILLIP     [facility]  [facility]  [address]  [address]  [place] [address]  Phone [phone]  Fax [phone]     Name: OMID ZIMMERMAN  MRN: [id number]  : 1955  Study Date: 2024 01:52 PM  Age: 69 yrs  Gender: Male  Patient Location: [facility]  Reason For Study: Chest pain, midsternal  Ordering Physician: KEON CLARK  Referring Physician: KEON CLARK  Performed By: Piter Garcia     BSA: 2.0 m2  Height: 69 in  Weight: 178 lb  HR: 59  BP: 136/72 mmHg  ______________________________________________________________________________  Procedure  Stress Echo Treadmill with two dimensional, color and spectral Doppler.     ______________________________________________________________________________  Interpretation Summary  Normal resting heart rate and blood pressure with normal response to exercise  Normal exercise capacity for age  Normal resting ECG, no ischemic changes with exercise  No chest pain with exercise  Normal resting LV function with good augmentation of all wall segments  postexercise  This is a normal stress echo indicating low probability of significant  exercise-induced myocardial ischemia  No hemodynamically significant valvular abnormalities on 2D or color  flow  imaging.  ______________________________________________________________________________  Stress  The patient exercised 9:00.  RPP 23,400.  Exercise was stopped due to fatigue.  The patient did not exhibit any symptoms during exercise.  There was a normal BP response to exercise.  The patient exhibited no chest pain during exercise.  A treadmill exercise test according to the Qamar protocol was performed.  Target Heart Rate was achieved.  The EKG portion of this stress test was negative for inducible ischemia (see  echo results below).  Arrhythmia induced during stress: occasional PVC's.  Normal left ventricular function and wall motion at rest and post-stress.  Left ventricular cavity size decreases with exercise.  Global LV systolic function augments with exercise.     Baseline  Normal baseline electrocardiogram.  Normal left ventricular function and wall motion at rest.     Stress Results             Protocol:  Qamar          Maximum Predicted HR:   153 bpm             Target HR: 130 bpm        % Maximum Predicted HR: 85 %               Stage  DurationHeart Rate  BP             Comment                   (mm:ss)   (bpm)           Stage 1   3:00      93    144/74           Stage 2   3:00     113    162/74           Stage 3   3:00     130    180/74          RecoveryR  4:57      77    126/78RPP=23,400; Carr= 9; FAC= Avg             Stress Duration:   9:00 mm:ss        Recovery Time: 4:57 mm:ss          Maximum Stress HR: 130 bpm           METS:          10     Left Ventricle  The left ventricle is normal in size. There is mild concentric left  ventricular hypertrophy.     Mitral Valve  The mitral valve leaflets appear normal. There is no evidence of stenosis,  fluttering, or prolapse. There is trace mitral regurgitation.     Tricuspid Valve  Normal tricuspid valve. There is trace to mild tricuspid regurgitation.     Aortic Valve  There is mild trileaflet aortic sclerosis. No hemodynamically  significant  valvular aortic stenosis.     Vessels  Normal size aorta.  ______________________________________________________________________________  MMode/2D Measurements & Calculations  IVSd: 1.1 cm  LVIDd: 4.7 cm  LVIDs: 2.4 cm  LVPWd: 1.3 cm  FS: 48.9 %  LV mass(C)dI: 107.8 grams/m2  Ao root diam: 3.6 cm  asc Aorta Diam: 3.8 cm  LVOT diam: 2.4 cm  LVOT area: 4.5 cm2  Ao root diam index Ht(cm/m): 2.1  Ao root diam index BSA (cm/m2): 1.8  Asc Ao diam index BSA (cm/m2): 1.9  Asc Ao diam index Ht(cm/m): 2.2     RWT: 0.55     Doppler Measurements & Calculations  TR max huy: 249.0 cm/sec  TR max P.8 mmHg     ______________________________________________________________________________  Report approved by: Blaine Collazo MD on 2024 03:44 PM           Please call if you have any questions  Team 2 R.N.s  [phone]    [facility]

## 2024-12-31 NOTE — TELEPHONE ENCOUNTER
Results reviewed and doned by Dr Clark. Left a message for patient to call back to review results.

## 2025-01-02 NOTE — TELEPHONE ENCOUNTER
Fasting lab work is now scheduled in March.    Results letter sent to patient as he does not use my chart.  Elyssa, Mr. Corea,    Dr. Clark reviewed your stress echo results. They are within normal limits. Your next office visit will be due in December, 2026.    Thank you for scheduling the fasting lipid panel and Lp(a) in on 3/4/2025 at our [facility] on 11:20 AM.    Please call if you have any questions  Team 2 R.N.s  [phone]

## 2025-03-04 ENCOUNTER — LAB (OUTPATIENT)
Dept: LAB | Facility: CLINIC | Age: 70
End: 2025-03-04
Payer: COMMERCIAL

## 2025-03-04 DIAGNOSIS — E78.00 PURE HYPERCHOLESTEROLEMIA: ICD-10-CM

## 2025-03-04 DIAGNOSIS — I25.10 CORONARY ARTERY DISEASE INVOLVING NATIVE CORONARY ARTERY OF NATIVE HEART WITHOUT ANGINA PECTORIS: ICD-10-CM

## 2025-03-04 LAB
ALT SERPL W P-5'-P-CCNC: 65 U/L (ref 0–70)
APO A-I SERPL-MCNC: 101 MG/DL
CHOLEST SERPL-MCNC: 125 MG/DL
FASTING STATUS PATIENT QL REPORTED: YES
HDLC SERPL-MCNC: 57 MG/DL
LDLC SERPL CALC-MCNC: 57 MG/DL
NONHDLC SERPL-MCNC: 68 MG/DL
TRIGL SERPL-MCNC: 55 MG/DL

## 2025-03-04 PROCEDURE — 80061 LIPID PANEL: CPT

## 2025-03-04 PROCEDURE — 83695 ASSAY OF LIPOPROTEIN(A): CPT

## 2025-03-04 PROCEDURE — 82465 ASSAY BLD/SERUM CHOLESTEROL: CPT

## 2025-03-04 PROCEDURE — 84460 ALANINE AMINO (ALT) (SGPT): CPT

## 2025-03-04 PROCEDURE — 36415 COLL VENOUS BLD VENIPUNCTURE: CPT

## 2025-03-05 ENCOUNTER — TELEPHONE (OUTPATIENT)
Dept: CARDIOLOGY | Facility: CLINIC | Age: 70
End: 2025-03-05
Payer: COMMERCIAL

## 2025-03-05 NOTE — LETTER
March 5, 2025       TO: Keith Corea   [address]  [place] [address]       Mr. Anmol Bergeron,    Dr. Clark reviewed your lipid panel and Lp(a) results and said: LDL much improved. Please continue with the rosuvastatin and zetia medications.    Component      Latest Ref Rng 3/4/2025  11:24 AM   Cholesterol      <200 mg/dL 125    Triglycerides      <150 mg/dL 55    HDL Cholesterol      >=40 mg/dL 57    LDL Cholesterol Calculated      <100 mg/dL 57    Non HDL Cholesterol      <130 mg/dL 68    Patient Fasting? Yes    ALT      0 - 70 U/L 65    Lipoprotein (a)      <30 mg/dL 101 (H)      Sincerely,  Team R.N.s  [phone]  [facility]

## 2025-03-05 NOTE — TELEPHONE ENCOUNTER
Lipid panel and Lp(a) 3/4/2025 noted. Ordered for risk stratification.    Per Dr. Clark's dictation 12/19/2024  IMPRESSION:   1.  Coronary artery disease, as described above.  Remains on aspirin 81 mg daily, atorvastatin 80 mg daily, and Toprol-XL 12.5 mg daily.  2.  Dyslipidemia.  Lipids above goal on atorvastatin 80 mg daily.  PLAN  Mr. Corea Is doing well overall from a cardiovascular standpoint.  There is no evidence of angina or congestive heart failure.  I am puzzled by the results of his most recent lipid panel, given that he has lost weight and is fully compliant with his atorvastatin.  We will recheck these numbers today and check an LP(a) for further risk stratification.  I will also order a stress echocardiogram to rule out progressive obstructive coronary artery disease.  Assuming the findings are within normal limits, I will plan on following up with him in approximately 2 years.    Stress echo: no ischemia    Phone note 12/19/2024: changed statin to crestor 40 mg + zetia 10mg    Component      Latest Ref Rng 12/19/2024  9:00 AM 3/4/2025  11:24 AM   Cholesterol      <200 mg/dL 155  125    Triglycerides      <150 mg/dL 111  55    HDL Cholesterol      >=40 mg/dL 49  57    LDL Cholesterol Calculated      <100 mg/dL 84  57    Non HDL Cholesterol      <130 mg/dL 106  68    Patient Fasting? Yes  Yes    Lipoprotein (a)      <30 mg/dL 91 (H)  101 (H)    ALT      0 - 70 U/L  65       Legend:  (H) High    Will message Dr. Clark to review

## 2025-03-05 NOTE — TELEPHONE ENCOUNTER
Attempted to contact patient to review lipid results. Left a detailed message of stable results and to continue the crestor and zetia medications. Results letter also sent.

## 2025-03-18 ENCOUNTER — TELEPHONE (OUTPATIENT)
Dept: CARDIOLOGY | Facility: CLINIC | Age: 70
End: 2025-03-18
Payer: COMMERCIAL

## 2025-03-18 NOTE — TELEPHONE ENCOUNTER
Refills of metoprolol were sent in to requested pharmacy by Dr. Clark on 12/19/24 for 90 tabs and 3 refills.  Called and spoke to Keith, who says he has not contacted the pharmacy directly, but will reach out to them for his refill request.

## 2025-03-18 NOTE — TELEPHONE ENCOUNTER
[facility] Call Center    Phone Message    May a detailed message be left on voicemail: yes     Reason for Call: Medication Refill Request    Has the patient contacted the pharmacy for the refill? Yes   Name of medication being requested: metoprolol succinate ER (TOPROL XL) 25 MG 24 hr tablet   Provider who prescribed the medication: Dr. Amandeep Clark  Pharmacy:    [id number]  [place], [place] - [address] [facility] [address]    Date medication is needed: 03/18/2025       Action Taken: Other: Cardiology    Travel Screening: Not Applicable     Thank you!  [facility]

## (undated) RX ORDER — FENTANYL CITRATE 50 UG/ML
INJECTION, SOLUTION INTRAMUSCULAR; INTRAVENOUS
Status: DISPENSED
Start: 2018-01-31

## (undated) RX ORDER — NITROGLYCERIN 5 MG/ML
VIAL (ML) INTRAVENOUS
Status: DISPENSED
Start: 2018-01-31

## (undated) RX ORDER — HEPARIN SODIUM 1000 [USP'U]/ML
INJECTION, SOLUTION INTRAVENOUS; SUBCUTANEOUS
Status: DISPENSED
Start: 2018-01-31

## (undated) RX ORDER — LIDOCAINE HYDROCHLORIDE 10 MG/ML
INJECTION, SOLUTION EPIDURAL; INFILTRATION; INTRACAUDAL; PERINEURAL
Status: DISPENSED
Start: 2018-01-31

## (undated) RX ORDER — POTASSIUM CHLORIDE 1500 MG/1
TABLET, EXTENDED RELEASE ORAL
Status: DISPENSED
Start: 2018-01-31

## (undated) RX ORDER — ADENOSINE 3 MG/ML
INJECTION, SOLUTION INTRAVENOUS
Status: DISPENSED
Start: 2018-01-31

## (undated) RX ORDER — VERAPAMIL HYDROCHLORIDE 2.5 MG/ML
INJECTION, SOLUTION INTRAVENOUS
Status: DISPENSED
Start: 2018-01-31

## (undated) RX ORDER — FENTANYL CITRATE 50 UG/ML
INJECTION, SOLUTION INTRAMUSCULAR; INTRAVENOUS
Status: DISPENSED
Start: 2017-12-12